# Patient Record
Sex: FEMALE | Race: WHITE | HISPANIC OR LATINO | Employment: OTHER | URBAN - METROPOLITAN AREA
[De-identification: names, ages, dates, MRNs, and addresses within clinical notes are randomized per-mention and may not be internally consistent; named-entity substitution may affect disease eponyms.]

---

## 2017-12-23 PROCEDURE — 3E0234Z INTRODUCTION OF SERUM, TOXOID AND VACCINE INTO MUSCLE, PERCUTANEOUS APPROACH: ICD-10-PCS | Performed by: HOSPITALIST

## 2017-12-24 ENCOUNTER — APPOINTMENT (OUTPATIENT)
Dept: RADIOLOGY | Facility: MEDICAL CENTER | Age: 67
DRG: 432 | End: 2017-12-24
Attending: HOSPITALIST
Payer: MEDICAID

## 2017-12-24 ENCOUNTER — HOSPITAL ENCOUNTER (INPATIENT)
Facility: MEDICAL CENTER | Age: 67
LOS: 4 days | DRG: 432 | End: 2017-12-28
Attending: EMERGENCY MEDICINE | Admitting: HOSPITALIST
Payer: MEDICAID

## 2017-12-24 ENCOUNTER — RESOLUTE PROFESSIONAL BILLING HOSPITAL PROF FEE (OUTPATIENT)
Dept: HOSPITALIST | Facility: MEDICAL CENTER | Age: 67
End: 2017-12-24
Payer: MEDICAID

## 2017-12-24 ENCOUNTER — APPOINTMENT (OUTPATIENT)
Dept: RADIOLOGY | Facility: MEDICAL CENTER | Age: 67
DRG: 432 | End: 2017-12-24
Attending: EMERGENCY MEDICINE
Payer: MEDICAID

## 2017-12-24 DIAGNOSIS — K92.2 UPPER GI BLEED: ICD-10-CM

## 2017-12-24 PROBLEM — K70.30 ALC (ALCOHOLIC LIVER CIRRHOSIS) (HCC): Status: ACTIVE | Noted: 2017-12-24

## 2017-12-24 PROBLEM — D64.9 ANEMIA: Status: ACTIVE | Noted: 2017-12-24

## 2017-12-24 PROBLEM — B18.2 CHRONIC HEPATITIS C WITH CIRRHOSIS (HCC): Status: ACTIVE | Noted: 2017-12-24

## 2017-12-24 PROBLEM — I95.9 HYPOTENSION: Status: ACTIVE | Noted: 2017-12-24

## 2017-12-24 PROBLEM — K74.60 CHRONIC HEPATITIS C WITH CIRRHOSIS (HCC): Status: ACTIVE | Noted: 2017-12-24

## 2017-12-24 PROBLEM — E46 PROTEIN MALNUTRITION (HCC): Status: ACTIVE | Noted: 2017-12-24

## 2017-12-24 PROBLEM — D61.818 PANCYTOPENIA (HCC): Status: ACTIVE | Noted: 2017-12-24

## 2017-12-24 LAB
ABO GROUP BLD: NORMAL
ABO GROUP BLD: NORMAL
ALBUMIN SERPL BCP-MCNC: 2.3 G/DL (ref 3.2–4.9)
ALBUMIN/GLOB SERPL: 0.6 G/DL
ALP SERPL-CCNC: 144 U/L (ref 30–99)
ALT SERPL-CCNC: 39 U/L (ref 2–50)
ANION GAP SERPL CALC-SCNC: 5 MMOL/L (ref 0–11.9)
APTT PPP: 40.1 SEC (ref 24.7–36)
AST SERPL-CCNC: 72 U/L (ref 12–45)
BARCODED ABORH UBTYP: 5100
BARCODED ABORH UBTYP: 5100
BARCODED ABORH UBTYP: 6200
BARCODED PRD CODE UBPRD: NORMAL
BARCODED UNIT NUM UBUNT: NORMAL
BASOPHILS # BLD AUTO: 0.8 % (ref 0–1.8)
BASOPHILS # BLD: 0.03 K/UL (ref 0–0.12)
BILIRUB SERPL-MCNC: 0.7 MG/DL (ref 0.1–1.5)
BLD GP AB SCN SERPL QL: NORMAL
BUN SERPL-MCNC: 17 MG/DL (ref 8–22)
CALCIUM SERPL-MCNC: 8.1 MG/DL (ref 8.5–10.5)
CFT BLD TEG: 4.7 MIN (ref 5–10)
CHLORIDE SERPL-SCNC: 113 MMOL/L (ref 96–112)
CLOT ANGLE BLD TEG: 59.6 DEGREES (ref 53–72)
CLOT LYSIS 30M P MA LENFR BLD TEG: 0 % (ref 0–8)
CO2 SERPL-SCNC: 21 MMOL/L (ref 20–33)
COMPONENT FT 8504FT: NORMAL
COMPONENT FT 8504FT: NORMAL
COMPONENT R 8504R: NORMAL
CREAT SERPL-MCNC: 0.45 MG/DL (ref 0.5–1.4)
CT.EXTRINSIC BLD ROTEM: 3 MIN (ref 1–3)
EOSINOPHIL # BLD AUTO: 0.12 K/UL (ref 0–0.51)
EOSINOPHIL NFR BLD: 3.3 % (ref 0–6.9)
ERYTHROCYTE [DISTWIDTH] IN BLOOD BY AUTOMATED COUNT: 53.1 FL (ref 35.9–50)
EST. AVERAGE GLUCOSE BLD GHB EST-MCNC: 97 MG/DL
FERRITIN SERPL-MCNC: 3.9 NG/ML (ref 10–291)
FOLATE SERPL-MCNC: 11.8 NG/ML
GFR SERPL CREATININE-BSD FRML MDRD: >60 ML/MIN/1.73 M 2
GLOBULIN SER CALC-MCNC: 4 G/DL (ref 1.9–3.5)
GLUCOSE SERPL-MCNC: 121 MG/DL (ref 65–99)
HBA1C MFR BLD: 5 % (ref 0–5.6)
HCT VFR BLD AUTO: 27.9 % (ref 37–47)
HCT VFR BLD AUTO: 28.3 % (ref 37–47)
HCV AB SER QL: REACTIVE
HGB BLD-MCNC: 8.6 G/DL (ref 12–16)
HGB BLD-MCNC: 8.7 G/DL (ref 12–16)
HGB RETIC QN AUTO: 24.8 PG/CELL (ref 29–35)
IMM GRANULOCYTES # BLD AUTO: 0.01 K/UL (ref 0–0.11)
IMM GRANULOCYTES NFR BLD AUTO: 0.3 % (ref 0–0.9)
IMM RETICS NFR: 18.2 % (ref 9.3–17.4)
INR PPP: 2.09 (ref 0.87–1.13)
IRON SATN MFR SERPL: 15 % (ref 15–55)
IRON SERPL-MCNC: 62 UG/DL (ref 40–170)
LIPASE SERPL-CCNC: 51 U/L (ref 11–82)
LYMPHOCYTES # BLD AUTO: 0.52 K/UL (ref 1–4.8)
LYMPHOCYTES NFR BLD: 14.2 % (ref 22–41)
MCF BLD TEG: 50 MM (ref 50–70)
MCH RBC QN AUTO: 24.4 PG (ref 27–33)
MCHC RBC AUTO-ENTMCNC: 30.7 G/DL (ref 33.6–35)
MCV RBC AUTO: 79.5 FL (ref 81.4–97.8)
MONOCYTES # BLD AUTO: 0.31 K/UL (ref 0–0.85)
MONOCYTES NFR BLD AUTO: 8.4 % (ref 0–13.4)
NEUTROPHILS # BLD AUTO: 2.68 K/UL (ref 2–7.15)
NEUTROPHILS NFR BLD: 73 % (ref 44–72)
NRBC # BLD AUTO: 0 K/UL
NRBC BLD-RTO: 0 /100 WBC
PLATELET # BLD AUTO: 81 K/UL (ref 164–446)
POTASSIUM SERPL-SCNC: 4.1 MMOL/L (ref 3.6–5.5)
PREALB SERPL-MCNC: 5 MG/DL (ref 18–38)
PRODUCT TYPE UPROD: NORMAL
PROT SERPL-MCNC: 6.3 G/DL (ref 6–8.2)
PROTHROMBIN TIME: 23.2 SEC (ref 12–14.6)
RBC # BLD AUTO: 3.56 M/UL (ref 4.2–5.4)
RETICS # AUTO: 0.04 M/UL (ref 0.04–0.06)
RETICS/RBC NFR: 1.1 % (ref 0.8–2.1)
RH BLD: NORMAL
SODIUM SERPL-SCNC: 139 MMOL/L (ref 135–145)
TEG ALGORITHM TGALG: ABNORMAL
TIBC SERPL-MCNC: 409 UG/DL (ref 250–450)
TSH SERPL DL<=0.005 MIU/L-ACNC: 1.7 UIU/ML (ref 0.38–5.33)
UNIT STATUS USTAT: NORMAL
VIT B12 SERPL-MCNC: 540 PG/ML (ref 211–911)
WBC # BLD AUTO: 3.7 K/UL (ref 4.8–10.8)

## 2017-12-24 PROCEDURE — 85576 BLOOD PLATELET AGGREGATION: CPT

## 2017-12-24 PROCEDURE — C9113 INJ PANTOPRAZOLE SODIUM, VIA: HCPCS | Performed by: INTERNAL MEDICINE

## 2017-12-24 PROCEDURE — 76705 ECHO EXAM OF ABDOMEN: CPT

## 2017-12-24 PROCEDURE — 71020 DX-CHEST-2 VIEWS: CPT

## 2017-12-24 PROCEDURE — 160048 HCHG OR STATISTICAL LEVEL 1-5: Performed by: INTERNAL MEDICINE

## 2017-12-24 PROCEDURE — 500066 HCHG BITE BLOCK, ECT: Performed by: INTERNAL MEDICINE

## 2017-12-24 PROCEDURE — C9113 INJ PANTOPRAZOLE SODIUM, VIA: HCPCS | Performed by: HOSPITALIST

## 2017-12-24 PROCEDURE — 85046 RETICYTE/HGB CONCENTRATE: CPT

## 2017-12-24 PROCEDURE — 85025 COMPLETE CBC W/AUTO DIFF WBC: CPT

## 2017-12-24 PROCEDURE — 700111 HCHG RX REV CODE 636 W/ 250 OVERRIDE (IP): Performed by: HOSPITALIST

## 2017-12-24 PROCEDURE — 96368 THER/DIAG CONCURRENT INF: CPT

## 2017-12-24 PROCEDURE — 700105 HCHG RX REV CODE 258: Performed by: HOSPITALIST

## 2017-12-24 PROCEDURE — 86901 BLOOD TYPING SEROLOGIC RH(D): CPT

## 2017-12-24 PROCEDURE — 304561 HCHG STAT O2

## 2017-12-24 PROCEDURE — 83690 ASSAY OF LIPASE: CPT

## 2017-12-24 PROCEDURE — 83540 ASSAY OF IRON: CPT

## 2017-12-24 PROCEDURE — P9016 RBC LEUKOCYTES REDUCED: HCPCS

## 2017-12-24 PROCEDURE — 36430 TRANSFUSION BLD/BLD COMPNT: CPT

## 2017-12-24 PROCEDURE — 86923 COMPATIBILITY TEST ELECTRIC: CPT

## 2017-12-24 PROCEDURE — 82746 ASSAY OF FOLIC ACID SERUM: CPT

## 2017-12-24 PROCEDURE — 94760 N-INVAS EAR/PLS OXIMETRY 1: CPT

## 2017-12-24 PROCEDURE — A9270 NON-COVERED ITEM OR SERVICE: HCPCS | Performed by: HOSPITALIST

## 2017-12-24 PROCEDURE — 700102 HCHG RX REV CODE 250 W/ 637 OVERRIDE(OP): Performed by: HOSPITALIST

## 2017-12-24 PROCEDURE — 96365 THER/PROPH/DIAG IV INF INIT: CPT

## 2017-12-24 PROCEDURE — 85384 FIBRINOGEN ACTIVITY: CPT

## 2017-12-24 PROCEDURE — 85730 THROMBOPLASTIN TIME PARTIAL: CPT

## 2017-12-24 PROCEDURE — 82607 VITAMIN B-12: CPT

## 2017-12-24 PROCEDURE — 99152 MOD SED SAME PHYS/QHP 5/>YRS: CPT | Performed by: INTERNAL MEDICINE

## 2017-12-24 PROCEDURE — 160203 HCHG ENDO MINUTES - 1ST 30 MINS LEVEL 4: Performed by: INTERNAL MEDICINE

## 2017-12-24 PROCEDURE — 96375 TX/PRO/DX INJ NEW DRUG ADDON: CPT

## 2017-12-24 PROCEDURE — 84443 ASSAY THYROID STIM HORMONE: CPT

## 2017-12-24 PROCEDURE — 86850 RBC ANTIBODY SCREEN: CPT

## 2017-12-24 PROCEDURE — 83550 IRON BINDING TEST: CPT

## 2017-12-24 PROCEDURE — 85347 COAGULATION TIME ACTIVATED: CPT

## 2017-12-24 PROCEDURE — P9017 PLASMA 1 DONOR FRZ W/IN 8 HR: HCPCS

## 2017-12-24 PROCEDURE — 80053 COMPREHEN METABOLIC PANEL: CPT

## 2017-12-24 PROCEDURE — 30233K1 TRANSFUSION OF NONAUTOLOGOUS FROZEN PLASMA INTO PERIPHERAL VEIN, PERCUTANEOUS APPROACH: ICD-10-PCS | Performed by: INTERNAL MEDICINE

## 2017-12-24 PROCEDURE — 700105 HCHG RX REV CODE 258: Performed by: INTERNAL MEDICINE

## 2017-12-24 PROCEDURE — 99233 SBSQ HOSP IP/OBS HIGH 50: CPT | Performed by: HOSPITALIST

## 2017-12-24 PROCEDURE — 85018 HEMOGLOBIN: CPT

## 2017-12-24 PROCEDURE — 82728 ASSAY OF FERRITIN: CPT

## 2017-12-24 PROCEDURE — 502240 HCHG MISC OR SUPPLY RC 0272: Performed by: INTERNAL MEDICINE

## 2017-12-24 PROCEDURE — 06L38CZ OCCLUSION OF ESOPHAGEAL VEIN WITH EXTRALUMINAL DEVICE, VIA NATURAL OR ARTIFICIAL OPENING ENDOSCOPIC: ICD-10-PCS | Performed by: INTERNAL MEDICINE

## 2017-12-24 PROCEDURE — 700111 HCHG RX REV CODE 636 W/ 250 OVERRIDE (IP): Performed by: INTERNAL MEDICINE

## 2017-12-24 PROCEDURE — 84134 ASSAY OF PREALBUMIN: CPT

## 2017-12-24 PROCEDURE — 770022 HCHG ROOM/CARE - ICU (200)

## 2017-12-24 PROCEDURE — 83036 HEMOGLOBIN GLYCOSYLATED A1C: CPT

## 2017-12-24 PROCEDURE — 86900 BLOOD TYPING SEROLOGIC ABO: CPT

## 2017-12-24 PROCEDURE — 87522 HEPATITIS C REVRS TRNSCRPJ: CPT

## 2017-12-24 PROCEDURE — 700105 HCHG RX REV CODE 258: Performed by: EMERGENCY MEDICINE

## 2017-12-24 PROCEDURE — 85610 PROTHROMBIN TIME: CPT

## 2017-12-24 PROCEDURE — 99291 CRITICAL CARE FIRST HOUR: CPT

## 2017-12-24 PROCEDURE — 86803 HEPATITIS C AB TEST: CPT

## 2017-12-24 PROCEDURE — 85014 HEMATOCRIT: CPT

## 2017-12-24 RX ORDER — MIDAZOLAM HYDROCHLORIDE 1 MG/ML
1-5 INJECTION INTRAMUSCULAR; INTRAVENOUS ONCE
Status: COMPLETED | OUTPATIENT
Start: 2017-12-24 | End: 2017-12-24

## 2017-12-24 RX ORDER — SODIUM CHLORIDE 9 MG/ML
INJECTION, SOLUTION INTRAVENOUS ONCE
Status: COMPLETED | OUTPATIENT
Start: 2017-12-24 | End: 2017-12-24

## 2017-12-24 RX ORDER — SODIUM CHLORIDE 9 MG/ML
INJECTION, SOLUTION INTRAVENOUS CONTINUOUS
Status: DISCONTINUED | OUTPATIENT
Start: 2017-12-24 | End: 2017-12-25

## 2017-12-24 RX ORDER — BISACODYL 10 MG
10 SUPPOSITORY, RECTAL RECTAL
Status: DISCONTINUED | OUTPATIENT
Start: 2017-12-24 | End: 2017-12-28 | Stop reason: HOSPADM

## 2017-12-24 RX ORDER — PANTOPRAZOLE SODIUM 40 MG/10ML
40 INJECTION, POWDER, LYOPHILIZED, FOR SOLUTION INTRAVENOUS 2 TIMES DAILY
Status: DISCONTINUED | OUTPATIENT
Start: 2017-12-24 | End: 2017-12-26

## 2017-12-24 RX ORDER — AMOXICILLIN 250 MG
2 CAPSULE ORAL 2 TIMES DAILY
Status: DISCONTINUED | OUTPATIENT
Start: 2017-12-24 | End: 2017-12-28 | Stop reason: HOSPADM

## 2017-12-24 RX ORDER — MIDAZOLAM HYDROCHLORIDE 1 MG/ML
INJECTION INTRAMUSCULAR; INTRAVENOUS
Status: COMPLETED
Start: 2017-12-24 | End: 2017-12-24

## 2017-12-24 RX ORDER — DIPHENHYDRAMINE HYDROCHLORIDE 50 MG/ML
12.5 INJECTION INTRAMUSCULAR; INTRAVENOUS EVERY 6 HOURS PRN
Status: DISCONTINUED | OUTPATIENT
Start: 2017-12-24 | End: 2017-12-28 | Stop reason: HOSPADM

## 2017-12-24 RX ORDER — SODIUM CHLORIDE 9 MG/ML
500 INJECTION, SOLUTION INTRAVENOUS
Status: ACTIVE | OUTPATIENT
Start: 2017-12-24 | End: 2017-12-24

## 2017-12-24 RX ORDER — MIDAZOLAM HYDROCHLORIDE 1 MG/ML
.5-2 INJECTION INTRAMUSCULAR; INTRAVENOUS PRN
Status: ACTIVE | OUTPATIENT
Start: 2017-12-24 | End: 2017-12-24

## 2017-12-24 RX ORDER — POLYETHYLENE GLYCOL 3350 17 G/17G
1 POWDER, FOR SOLUTION ORAL
Status: DISCONTINUED | OUTPATIENT
Start: 2017-12-24 | End: 2017-12-28 | Stop reason: HOSPADM

## 2017-12-24 RX ADMIN — STANDARDIZED SENNA CONCENTRATE AND DOCUSATE SODIUM 2 TABLET: 8.6; 5 TABLET, FILM COATED ORAL at 20:25

## 2017-12-24 RX ADMIN — FENTANYL CITRATE 50 MCG: 50 INJECTION INTRAMUSCULAR; INTRAVENOUS at 13:04

## 2017-12-24 RX ADMIN — SODIUM CHLORIDE 80 MG: 9 INJECTION, SOLUTION INTRAVENOUS at 09:08

## 2017-12-24 RX ADMIN — MIDAZOLAM HYDROCHLORIDE 2 MG: 1 INJECTION, SOLUTION INTRAMUSCULAR; INTRAVENOUS at 13:06

## 2017-12-24 RX ADMIN — OCTREOTIDE ACETATE 50 MCG/HR: 200 INJECTION, SOLUTION INTRAVENOUS; SUBCUTANEOUS at 14:02

## 2017-12-24 RX ADMIN — PANTOPRAZOLE SODIUM 40 MG: 40 INJECTION, POWDER, FOR SOLUTION INTRAVENOUS at 20:24

## 2017-12-24 RX ADMIN — SODIUM CHLORIDE: 9 INJECTION, SOLUTION INTRAVENOUS at 08:00

## 2017-12-24 RX ADMIN — SODIUM CHLORIDE: 9 INJECTION, SOLUTION INTRAVENOUS at 08:15

## 2017-12-24 RX ADMIN — SODIUM CHLORIDE 8 MG/HR: 9 INJECTION, SOLUTION INTRAVENOUS at 09:24

## 2017-12-24 RX ADMIN — DIPHENHYDRAMINE HYDROCHLORIDE 12.5 MG: 50 INJECTION, SOLUTION INTRAMUSCULAR; INTRAVENOUS at 13:56

## 2017-12-24 RX ADMIN — THIAMINE HYDROCHLORIDE 100 MG: 100 INJECTION, SOLUTION INTRAMUSCULAR; INTRAVENOUS at 09:29

## 2017-12-24 RX ADMIN — CEFTRIAXONE 2 G: 2 INJECTION, POWDER, FOR SOLUTION INTRAMUSCULAR; INTRAVENOUS at 13:09

## 2017-12-24 ASSESSMENT — ENCOUNTER SYMPTOMS
SPEECH CHANGE: 0
ABDOMINAL PAIN: 0
DIZZINESS: 0
BACK PAIN: 0
CHILLS: 0
COUGH: 0
MYALGIAS: 0
EYE DISCHARGE: 0
BLURRED VISION: 0
DEPRESSION: 0
NAUSEA: 0
VOMITING: 0
NERVOUS/ANXIOUS: 0
FEVER: 0
WEAKNESS: 1
SHORTNESS OF BREATH: 0
HEARTBURN: 1
BRUISES/BLEEDS EASILY: 0
BLOOD IN STOOL: 0
DOUBLE VISION: 0

## 2017-12-24 ASSESSMENT — PAIN SCALES - GENERAL
PAINLEVEL_OUTOF10: 0
PAINLEVEL_OUTOF10: 3
PAINLEVEL_OUTOF10: 3
PAINLEVEL_OUTOF10: 0

## 2017-12-24 NOTE — CONSULTS
Pulmonary & Critical Care Consult Note    DATE OF CONSULTATION:  12/24/2017     REFERRING PHYSICIAN:  Dr. Servin     CONSULTANT:  Dre Davis III, MD     REASON FOR CONSULTATION: GI bleed, hypotension     HISTORY OF PRESENT ILLNESS:  This patient is a pleasant 67-year-old woman who speaks very little English. She was transferred in from [place] for a GI bleed. She presented with about 2 AM with hematemesis. She had a GI bleed about a year ago and again about 3 months ago we don't have any records as of yet. She cannot recall the specific etiology but was told she had hepatitis C. He denies much alcohol took Advil couple times a few days ago and doesn't take it regularly and smokes just a few cigarettes a day. She has no abdominal pain now. She's never told than ulcer or varices. Bleeding history in the family. He is seen in the ICU on arrival and has a blood pressure in the 80s but her heart rates in the 70s and she states her blood pressure does usually run low. Entire history was through family that spoke English. IV fluid resuscitation ongoing. I consultation followed my consultation and she is going for EGD this afternoon and I am told.     PAST MEDICAL HISTORY:    Upper GI bleed one year ago associated with hematemesis  Upper GI bleed 3 months ago associated with melena and hematemesis, she had an EGD in [place]  Vague history of hepatitis C, looking for documentation  Old records requested    Past Medical History:   Diagnosis Date   • Anemia    • Cirrhosis with alcoholism (CMS-HCC)    • Coagulopathy (CMS-HCC)    • Liver disease         PAST SURGICAL HISTORY:    History reviewed. No pertinent surgical history.     ALLERGIES:    Patient has no known allergies.     MEDICATIONS PRIOR TO ADMISSION:   No current facility-administered medications on file prior to encounter.      No current outpatient prescriptions on file prior to encounter.       SOCIAL HISTORY:    She smokes a couple cigarettes a day at most in  "the past but has quit  Alcohol consumption is none now but she used to drink may be a drink a day never heavy    Social History     Social History   • Marital status: Single     Spouse name: N/A   • Number of children: N/A   • Years of education: N/A     Occupational History   • Not on file.     Social History Main Topics   • Smoking status: Never Smoker   • Smokeless tobacco: Never Used   • Alcohol use Not on file   • Drug use: Unknown   • Sexual activity: Not on file     Other Topics Concern   • Not on file     Social History Narrative   • No narrative on file       FAMILY HISTORY:   History reviewed. No pertinent family history.     REVIEW OF SYSTEMS:    Constitutional: No fever, no chills, No sweats  Respiratory: No cough, no hemoptysis, no dyspnea  Cardiovascular: No chest pain, no palpitations, no orthopnea, no PND, no lower extremity swelling  GI: Nausea has improved, no vomiting since 2 AM, minimal abdominal pain, no diarrhea, no constipation, no hematochezia, no melena  : no dysuria, no frequency, no urgency, no flank pain  Endocrine: No polyuria, no polydipsia, no hair loss  Hematology: No easy bruising, no bleeding  Musculoskeletal: No joint swelling, no joint erythema, no arthralgia, no myalgias  Neuro: No seizures, no numbness, no tingling sensation, no extremity weakness, no change in vision.  Psychiatry: no depression, no suicidal ideation     PHYSICAL EXAMINATION:  /56   Pulse 79   Temp 37.2 °C (98.9 °F)   Resp 16   Ht 1.499 m (4' 11\")   Wt 53.5 kg (117 lb 15.1 oz)   SpO2 99%   BMI 23.82 kg/m²   GENERAL: Appears acutely and chronically ill, he appears her stated age, [place] speaking only  HEENT: PERRLA, nontender sinuses, no blood in the nares or oropharynx, M&P I airway  NECK: Supple without meningeal signs, trach midline without adenopathy, no thyromegaly, neck veins not distended  PULM: Clear to auscultation and percussion, no wheezes, rales or rhonchi, speaking in full " sentences  CVS: Regular rate and rhythm, no murmurs, rubs or gallops, okay pulses, normal capillary refill  ABDOMEN: Soft, NT/ND, no CVAT, no Xiang or Shook Laguerre sign, normal bowel sounds  EXTREMITIES:  No cyanosis, clubbing or edema  SKIN: Warm and dry without rash or lesion, no stigmata of cirrhosis  NEURO: Through  the patient is alert and oriented ×4, motor and sensory exams are nonfocal, not getting out of bed to examine her    LABORATORY DATA:    Lab Results   Component Value Date/Time    WBC 3.7 (L) 12/24/2017 05:10 AM    RBC 3.56 (L) 12/24/2017 05:10 AM    HEMOGLOBIN 8.7 (L) 12/24/2017 05:10 AM    HEMATOCRIT 28.3 (L) 12/24/2017 05:10 AM    MCV 79.5 (L) 12/24/2017 05:10 AM    MCH 24.4 (L) 12/24/2017 05:10 AM    MCHC 30.7 (L) 12/24/2017 05:10 AM    NEUTSPOLYS 73.00 (H) 12/24/2017 05:10 AM    LYMPHOCYTES 14.20 (L) 12/24/2017 05:10 AM    MONOCYTES 8.40 12/24/2017 05:10 AM    EOSINOPHILS 3.30 12/24/2017 05:10 AM    BASOPHILS 0.80 12/24/2017 05:10 AM      Lab Results   Component Value Date/Time    SODIUM 139 12/24/2017 05:10 AM    POTASSIUM 4.1 12/24/2017 05:10 AM    CHLORIDE 113 (H) 12/24/2017 05:10 AM    CO2 21 12/24/2017 05:10 AM    GLUCOSE 121 (H) 12/24/2017 05:10 AM    BUN 17 12/24/2017 05:10 AM    CREATININE 0.45 (L) 12/24/2017 05:10 AM      Lab Results   Component Value Date/Time    PROTHROMBTM 23.2 (H) 12/24/2017 08:30 AM    INR 2.09 (H) 12/24/2017 08:30 AM         IMAGING:   CXR (personally reviewed) - Lungs are clear, cardiac silhouettes normal, no pneumothorax, mass or effusion about grossly normal chest x-ray with no active cardiopulmonary disease noted    US-LIVER AND BILIARY TREE   Final Result      1.  Significant gallbladder wall thickening is nonspecific and may be related to chronic liver disease. Acalculous cholecystitis could have this appearance in the appropriate clinical setting.      2.  Coarsened hepatic echotexture suggests chronic liver disease.      DX-CHEST-2 VIEWS   Final  Result      No acute cardiopulmonary disease.             ASSESSMENT/PLAN:  Recurrent upper gastrointestinal hemorrhage, suspect variceal bleeding   Admit to ICU   Place 2 large bore IVs   Fluid resuscitation ongoing   Serial H/H   COD sent in case she needs blood products   PPI bolus and infusion and escalate to bolus twice a day when clinically appropriate   Octreotide infusion   Nothing by mouth, EGD later today I'm told   GI consult pending   Pain history from transferring hospital in regards to from 3 months ago and one year ago  Hypotension   Family and patient stated blood pressure normally runs low in the 90s   Urinary output, good mentation and NSR the 70s without orthostatic symptoms suggest this is true   Continue current fluid resuscitation   Monitor for the need to place central line and initiate pressors   Ideally resuscitate hemorrhagic shock with fluids and blood products first   She may benefit from albumin as well  History of cirrhosis with hepatitis C versus alcohol, history incomplete   Alcoholism may be an etiology for cirrhosis as well   She has associated thrombocytopenia, coagulopathy, hypoalbuminemia   Obtain old records from [place]   GI consult pending, it is their role for Harvoni treatment in this patient?  Coagulopathy   Coags elevated, TEG study suggests he is not hypocoagulable   FFP already ordered   Follow laboratory studies daily  Pancytopenia   Etiology I suspect is cirrhosis, further workup clinically after acute bleeding process resolved  Hypoalbuminemia   We'll give salt poor albumin infusions for hypotension if necessary   Etiology of this process I believe his cirrhosis  Prophylaxis    Prognosis guarded.  Critically ill with unstable GI bleed with negative acute anemia associated with trauma cytopenia, coagulopathy and hypoalbuminemia.  Her blood pressure is running low at this time but she is tolerating it so far and fluid resuscitation ongoing.  She will be monitored closely  with serial H&H and vital sign assessment and monitor her or the need for blood product infusion and/or central line placement for better axis. High risk of deterioration and worsening vital organ dysfunction and death without the above critical care interventions.    Addendum:  She is just back from EGD, significant esophageal varices identified and they were banded, I spoke with GI at great length, continue octreotide but brought back to bolus PPI IV.    Patient is critically ill at this time.    I have discussed the case with the patient, for family members including 2 who speak English very well, mentioned physician, GI consultant, nursing and charge nurse.  Critical care time: 55 min.   No time overlap. Procedures not included in time.     Thank you for asking me to consult on the patient.  I appreciate the opportunity to assist in their care and will follow along closely with you.          Dre Davis III, MD  Critical Care Medicine

## 2017-12-24 NOTE — CONSULTS
Date of Service:12/24/17    Consult Requested By: No att. providers found    Reason for Consultation: hematemesis    History of Present Illness: 67 y.o. female who presented 12/24/2017 with Acute GI bleed . She's had hematemesis bright red blood acute onset yesterday evening . Transferred to [facility] also had another episode of hematemesis noted by the paramedics, they do note there was a large amount of hematemesis at the scene. She was hypotensive upon arrival which improved with fluids . She's had no bleeding en route to our emergency Department [facility] emergency room. She denies any alcohol use no nausea no abdominal pain no diarrhea no constipation. She has had NSAID use with frequent headaches she takes Advil a few pills at a time . No Advil use currently . She states she's quit alcohol several weeks ago . She's also had a diagnosis of hepatitis C she is unsure if she has started treatment or has seen a hepatologist or GI doctor for this . She's also been complaining of heartburn + severe for the past several days. She's also been complaining of feeling weak, but presently denies any light headedness or dizziness, fever chills, SOB , CP dysphagia, odynophagia,reflux, +GERD, no abdominal complains,  abnormalities weakness numbness     Review Of Systems:  See above    PMH:   Past Medical History:   Diagnosis Date   • Anemia    • Cirrhosis with alcoholism (CMS-HCC)    • Coagulopathy (CMS-HCC)    • Liver disease          PSH:  History reviewed. No pertinent surgical history.    FAMILY HX:  History reviewed. No pertinent family history.    SOCIAL HX:  Social History     Social History   • Marital status: Single     Spouse name: N/A   • Number of children: N/A   • Years of education: N/A     Occupational History   • Not on file.     Social History Main Topics   • Smoking status: Never Smoker   • Smokeless tobacco: Never Used   • Alcohol use Not on file   • Drug use: Unknown   • Sexual activity: Not on file      Other Topics Concern   • Not on file     Social History Narrative   • No narrative on file     History   Smoking Status   • Never Smoker   Smokeless Tobacco   • Never Used     History   Alcohol use Not on file       Allergies/Intolerances:  No Known Allergies    History reviewed with the patient    Other Current Medications:    Current Facility-Administered Medications:   •  pantoprazole (PROTONIX) 80 mg in  mL Infusion, 8 mg/hr, Intravenous, Continuous, Umesh Servin M.D., Last Rate: 25 mL/hr at 17 0924, 8 mg/hr at 17 0924  •  thiamine (B-1) 100 mg in D5W 100 mL IVPB, 100 mg, Intravenous, DAILY, Umesh Servin M.D., Stopped at 17 1004  •  folic acid 1 mg in NS 50 mL IVPB, 1 mg, Intravenous, Q24HRS, Umesh Servin M.D.  •  NS infusion, , Intravenous, Continuous, Vanna Doan M.D.  •  senna-docusate (PERICOLACE or SENOKOT S) 8.6-50 MG per tablet 2 Tab, 2 Tab, Oral, BID, Stopped at 17 0900 **AND** polyethylene glycol/lytes (MIRALAX) PACKET 1 Packet, 1 Packet, Oral, QDAY PRN **AND** magnesium hydroxide (MILK OF MAGNESIA) suspension 30 mL, 30 mL, Oral, QDAY PRN **AND** bisacodyl (DULCOLAX) suppository 10 mg, 10 mg, Rectal, QDAY PRN, Umesh Servin M.D.  [unfilled]    Most Recent Vital Signs:  BP (!) 80/47   Pulse 78   Temp 36.2 °C (97.2 °F)   Resp 13   Ht 1.524 m (5')   Wt 50.8 kg (112 lb)   SpO2 100%   BMI 21.87 kg/m²   Temp  Av.5 °C (97.7 °F)  Min: 36.2 °C (97.2 °F)  Max: 36.8 °C (98.2 °F)    Physical Exam:  General: Nontoxic, no acute distress  HEENT: sclera anicteric, PERRL, EOMI, MMM, no oral lesions  Neck: supple, no lymphadenopathy  Chest: CTAB, no r/r/w, normal work of breathing.  Cardiac: Regular, rate ,  SHAYLA 3/6 no gallops heard  Abdomen: + bowel sounds, soft, non-tender, non-distended, no HSM  Extremities: No edema. No joint swelling.  Skin: no rashes or erythema  Neuro: Alert and oriented times 3, non-focal exam    Pertinent Lab  Results:  Recent Labs      12/24/17   0510   WBC  3.7*      Recent Labs      12/24/17   0510   HEMOGLOBIN  8.7*   HEMATOCRIT  28.3*   MCV  79.5*   MCH  24.4*   PLATELETCT  81*         Recent Labs      12/24/17   0510   SODIUM  139   POTASSIUM  4.1   CHLORIDE  113*   CO2  21   CREATININE  0.45*        Recent Labs      12/24/17   0510   ALBUMIN  2.3*      Recent Labs      12/24/17   0510  12/24/17   0830   ASTSGOT  72*   --    ALTSGPT  39   --    TBILIRUBIN  0.7   --    ALKPHOSPHAT  144*   --    GLOBULIN  4.0*   --    INR   --   2.09*       [unfilled]      Pertinent Micro:  Results     Procedure Component Value Units Date/Time    URINALYSIS CULTURE, IF INDICATED [[id number]]     Order Status:  Sent Specimen:  Urine         No results found for: BLOODCULTU, BLDCULT, BCHOLD     Studies:              Results for orders placed during the hospital encounter of 12/24/17   DX-CHEST-2 VIEWS    Impression No acute cardiopulmonary disease.                                                                                  IMPRESSION:   Hematemesis  Melena  HCV +, though admits to no risk factors  Cirrhosis        PLAN:     WE would recommend octreotide IV,IV Protonix 40 mg BID npo, two large IV bore needles, keep H/H >7/21 with blood transfusion and correct underlying coagulopathy with FFP and VItK, check an U/S of Abd and AFP level   We would like to perform and EGD once she has received her FFP.  Continue supportive care with IV hydration and ICU supportive care.  Possible variceal bleed vs GV vs ulcer formation given history of NSAIDS.    Discussed with IM. Will continue to follow    Gen Currie M.D.

## 2017-12-24 NOTE — ASSESSMENT & PLAN NOTE
Due to acute blood loss from upper GI bleed.  egd with esophageal varices s/p banding   CBC in a.m.   on cld  - ppi bid  Off octreotide gtt  B12, folate wnl

## 2017-12-24 NOTE — ASSESSMENT & PLAN NOTE
On lactulose  GI   Hx of alcohol abuse states she has not drank in a while; would monitor   U/s with chronic liver dz  - advised etoh cessation

## 2017-12-24 NOTE — ASSESSMENT & PLAN NOTE
Hematemesis prior to admission with anemia and hypotension requiring ICU admission.  Serial Hb with transfusion for Hb <7 or symptomatic  GI , Dr. Landry  EGD revealed esophageal varices which were banded..  Cont Rocephin for 5 day course

## 2017-12-24 NOTE — ASSESSMENT & PLAN NOTE
Unsure if patient has completed treatment; she does not know and no reccords found  Outpatient follow up is appropriate.  - f/u hep C viral load

## 2017-12-24 NOTE — OR SURGEON
Immediate Post OP Note    PreOp Diagnosis: melena/hematemesis    PostOp Diagnosis:    1/ grade three esophageal varices three columns 34-38 cm    2/ old residual blood in stomach    3/ otherwise normal EGD to third portion of the duodenum    Procedure(s):  GASTROSCOPY-ENDO    Surgeon(s):  Gen Currie M.D.    Anesthesiologist/Type of Anesthesia:  No anesthesia staff entered./* No anesthesia type entered *  Fentanyl 50 mcg IV and 2 mg Versed IV  Surgical Staff:  Endoscopy Technician: (Unknown)  Sedation/Monitoring Nurse: (Unknown)  ICU nursing   Chase Sullivan  Specimens:  * No specimens in log *    Findings    Narrative:   Prior to the procedure the patient was informed of the risk and benefits of the procedure all question were answered.  The patient was sedated and once adequate sedation was achieved the bite block was placed. They were placed in the left lateral decubitus positron.   Using the standard Olympus gastroscope it was introduced through the oral pharynx and passed into the esophagus.  Intubation of the esophagus was achieved easily and grade 3 esophageal varices 3 columns extending from 34-38 cm. The GEJ 38 cm.   Intubation of the gastric cavity was performed and insufflation was achieved allowing for a panoramic view of the entire gastric mucosa. There is old residual blood appreciated within the gastric cavity no active bleeding was appreciated.  Advancement into the antrum allowed for visualization of the antrum and pylorus both of these structures appeared normal.  Intubation of the pylorus allowed for visualization of the duodenal bulb along with the second and third portion, all these structures appeared normal.  No mucosal abnormalities were appreciated.  Extubation of the pylorus allowed for retroflexion to be performed in the antrum to visualize the cardia, fundus and body, which also appeared normal no signs of gastric varices were appreciated no active bleeding was noted.  The gastroscope  was straightened and gradually withdrawn a banding device was then subsequently deployed under the scope and reintroduced. 5 bands were deployed on the 3 columns of the esophageal varices with good obliteration of the varices. No bleeding was appreciated during or after banding procedure.  The gastroscope was straightened excess air was removed and patient tolerated the procedure well.    Complications:none    Recommendations      Continue current therapy IV protonix and octreotide Iv for 72 hrs continue antibiotics   Start clear liquid diet in am if stable   Continue H/H monitoring keep > 7/21 with blood transfusion    If there are any further concerns or issues please feel free to give us call at [phone] we will still follow along.    12/24/2017 1:22 PM Gen Currie

## 2017-12-24 NOTE — ED NOTES
Med rec complete per Pt at bedside  Pt denies any medication   Allergies reviewed  No ABX in last 30 days

## 2017-12-24 NOTE — ED PROVIDER NOTES
ED Provider Note    Chief Complaint:   Upper GI bleed    HPI:  Brigitte Gonzales is a 67 y.o. female who presents as transfer from [facility] with concern for upper GI bleed. She has a history of hepatitis C, history of alcoholism but denies recent alcohol use. Earlier this evening she had an episode of hematemesis and activated paramedics. She was taken to [facility] emergency department. Since she arrived at that emergency department she had no further episodes of GI bleed, however paramedics report that there was a large amount of hematemesis at the scene. It is reported that she had hypotension prior to arrival which improved with fluids. She has not had any subsequent episodes of bleeding since arriving to that emergency department, no bleeding en route to our emergency department.    I discussed the case with the referring physician who states that he treated her with octreotide and Protonix prior to transfer. He reports that she had endoscopy performed in October 2016 as well as October 2017 after similar episodes of upper GI bleed, however the source of the bleed was not able to be found. These were done by the general surgical service. After this episode of upper GI bleeding, general surgery recommends transfer to our facility with GI availability, there is no gastroenterology availability at referring facility.    Review of Systems:  See HPI for pertinent positives and negatives. All other systems negative.    Past Medical History:   has a past medical history of Anemia; Cirrhosis with alcoholism (CMS-HCC); Coagulopathy (CMS-HCC); and Liver disease.    Social History:  Social History     Social History Main Topics   • Smoking status: Never Smoker   • Smokeless tobacco: Never Used   • Alcohol use Not on file   • Drug use: Unknown   • Sexual activity: Not on file       Surgical History:  patient denies any surgical history    Current Medications:  Home Medications    **Home medications have not yet  been reviewed for this encounter**         Allergies:  Not on File    Physical Exam:  Vital Signs: BP (!) 94/50   Pulse 85   Temp 36.8 °C (98.2 °F)   Resp (!) 0   Ht 1.524 m (5')   Wt 50.8 kg (112 lb)   SpO2 100%   BMI 21.87 kg/m²   Constitutional: Alert, no acute distress  HENT: Normocephalic, atraumatic, moist mucus membranes, no blood in oropharynx  Eyes: Pupils equal and reactive, normal conjunctiva, non-icteric  Neck: Supple, normal range of motion, no stridor  Cardiovascular: Normal peripheral perfusion, II/IV systolic ejection murmur, no cyanosis  Pulmonary: No respiratory distress, normal work of breathing, no accessory muscule usage, breath sounds clear and equal bilaterally  Abdomen: Bowel sounds present, non-tender, non-distended with no peritoneal signs, no palpable masses  Skin: Warm, dry, no rashes or lesions  Back: No pain with active range of motion  Musculoskeletal: Normal range of motion in all extremities, no swelling or deformity noted  Neurologic: Alert, oriented, normal motor function, no speech deficits  Psychiatric: Normal and appropriate mood and affect    Labs:  Labs Reviewed   CBC WITH DIFFERENTIAL - Abnormal; Notable for the following:        Result Value    WBC 3.7 (*)     RBC 3.56 (*)     Hemoglobin 8.7 (*)     Hematocrit 28.3 (*)     MCV 79.5 (*)     MCH 24.4 (*)     MCHC 30.7 (*)     RDW 53.1 (*)     Platelet Count 81 (*)     Neutrophils-Polys 73.00 (*)     Lymphocytes 14.20 (*)     Lymphs (Absolute) 0.52 (*)     All other components within normal limits   COMP METABOLIC PANEL - Abnormal; Notable for the following:     Chloride 113 (*)     Glucose 121 (*)     Creatinine 0.45 (*)     Calcium 8.1 (*)     AST(SGOT) 72 (*)     Alkaline Phosphatase 144 (*)     Albumin 2.3 (*)     Globulin 4.0 (*)     All other components within normal limits   LIPASE   ESTIMATED GFR   COD (ADULT)   BASIC TEG    Narrative:     Do you want to extend TEG graph to LY30? (If no, graph will  terminate at  MA)->Yes  Is the patient on heparin (including low molecular weight  heparin, subcutaneous heparin, or lovenox)?->No   ABO CONFIRMATION   URINALYSIS,CULTURE IF INDICATED       Radiology:  DX-CHEST-2 VIEWS   Final Result      No acute cardiopulmonary disease.           Medical records reviewed for continuity of care. Records reviewed from referring facility, [facility]. White blood count 3.3, hemoglobin 9.4, 1% bands. Potassium 3.5, troponin undetectable. Patient treated with Zofran, protonix, octreotide and fluid bolus prior to transfer. Presented to that facility after 2 episodes of hemoptysis. She has had 2 endoscopies performed at that facility by general surgery, surgeon recommended transfer to facility that has gastroenterology availability. History of prior regular alcohol use, denies history of alcoholism, varices not seen on prior endoscopies. Noted is a recent diagnosis of hepatitis C. Patient is not known to be established with a gastroenterologist. Also describes a history of liver cirrhosis. She does have a history of anti-inflammatory use prior to a previous GI bleed, was instructed to stop all NSAID use after that event.    Differential diagnosis:  Upper GI bleed, variceal bleed, peptic ulcer, anemia, electrolyte abnormality    MDM:  Patient presents as transfer from [facility] for upper GI bleed. Hemoglobin prior to transfer is 9.4. Hemoglobin is 8.7 on arrival to our emergency department. Platelet count is 81. She does have a documented history of thrombocytopenia on prior admissions as noted on medical record review. AST and alkaline phosphatase are mildly elevated, creatinine is within normal limits. Chest x-ray is negative for acute process. She did receive octreotide and Protonix prior to transfer.    She does not appear to have ongoing severe hemorrhage at this time. She has remained hemodynamically stable in the emergency department. I did place a call to gastroenterology, discussed the  case with Dr. Lopez, on call for digestive [facility]. He kindly agreed to evaluate the patient in the hospital. Case discussed with hospitalist who kindly agrees to admit the patient.    Disposition:  Admit to hospitalist in guarded condition    Final Impression:  1. Upper GI bleed        Electronically signed by: Vanna Doan, 12/24/2017 4:53 AM

## 2017-12-24 NOTE — ED NOTES
Chief Complaint   Patient presents with   • Upper GI Bleed         Pt bib care flight from [facility] for upper GI bleed with n/v (dark colored blood per pt report), starting at 2200, lasting until midnight. Pt aox4, [place] speaking only, denies pain at time of triage. Pt denies sob, cp, n/v at time of triage. Pt received 80mg protonix , medicated for nausea  1L Ns en route, and arrived on an octreotide gtt. Pt gowned, placed on monitors.       BP (!) 94/50   Pulse 95   Temp 36.8 °C (98.2 °F)   Resp 18   Ht 1.524 m (5')   Wt 50.8 kg (112 lb)   SpO2 93%   BMI 21.87 kg/m²

## 2017-12-24 NOTE — ED NOTES
MD at the bedside. Discussed plan of care. Agreed with admitting MD that ICU would be more appropriate for pt. 3rd IV started.

## 2017-12-24 NOTE — DIETARY
"Nutrition Services: Consult for \"Protein Def\"  67 year old female with admit dx of GI bleed  Past Pertinent Med Hx: anemia, cirrhosis with alcoholism, coagulopathy, liver disease    Pt is currently NPO x1 day. RD will monitor for diet to start and monitor for adequate intake.     Ht: 152.4 cm  Wt 12/24: 50.803 kg - stated  BMI: 21.87  Pertinent Labs: Glucose 121, Creatinine 0.45  Pertinent Meds: folic acid, pericolace, thiamine, protonix  Fluids: NS infusion  Skin: No skin breakdown noted in chart  GI: Last BM PTA    PLAN/RECOMMEND -   1) Nutrition rep to see patient daily for meal and snack preferences, when diet starts  2) Start PO diet when medically feasible.   3) Please obtain measured weight. Weekly weights to monitor fluid and nutrition status  4) Obtain supplement order per RD as needed.    RD following    "

## 2017-12-25 PROBLEM — I85.11 SECONDARY ESOPHAGEAL VARICES WITH BLEEDING (HCC): Status: ACTIVE | Noted: 2017-12-25

## 2017-12-25 PROBLEM — D68.9 COAGULOPATHY (HCC): Status: ACTIVE | Noted: 2017-12-25

## 2017-12-25 PROBLEM — E43 PROTEIN-CALORIE MALNUTRITION, SEVERE (HCC): Status: ACTIVE | Noted: 2017-12-25

## 2017-12-25 PROBLEM — E46 PROTEIN MALNUTRITION (HCC): Status: RESOLVED | Noted: 2017-12-24 | Resolved: 2017-12-25

## 2017-12-25 LAB
ALBUMIN SERPL BCP-MCNC: 2.4 G/DL (ref 3.2–4.9)
ALBUMIN/GLOB SERPL: 0.7 G/DL
ALP SERPL-CCNC: 92 U/L (ref 30–99)
ALT SERPL-CCNC: 34 U/L (ref 2–50)
ANION GAP SERPL CALC-SCNC: 6 MMOL/L (ref 0–11.9)
AST SERPL-CCNC: 67 U/L (ref 12–45)
BASOPHILS # BLD AUTO: 0.7 % (ref 0–1.8)
BASOPHILS # BLD: 0.03 K/UL (ref 0–0.12)
BILIRUB SERPL-MCNC: 1 MG/DL (ref 0.1–1.5)
BUN SERPL-MCNC: 14 MG/DL (ref 8–22)
CALCIUM SERPL-MCNC: 7.8 MG/DL (ref 8.5–10.5)
CFT BLD TEG: 4 MIN (ref 5–10)
CHLORIDE SERPL-SCNC: 112 MMOL/L (ref 96–112)
CHOLEST SERPL-MCNC: 96 MG/DL (ref 100–199)
CLOT ANGLE BLD TEG: 60.3 DEGREES (ref 53–72)
CLOT LYSIS 30M P MA LENFR BLD TEG: 0 % (ref 0–8)
CO2 SERPL-SCNC: 20 MMOL/L (ref 20–33)
CREAT SERPL-MCNC: 0.58 MG/DL (ref 0.5–1.4)
CT.EXTRINSIC BLD ROTEM: 3.2 MIN (ref 1–3)
EOSINOPHIL # BLD AUTO: 0.15 K/UL (ref 0–0.51)
EOSINOPHIL NFR BLD: 3.3 % (ref 0–6.9)
ERYTHROCYTE [DISTWIDTH] IN BLOOD BY AUTOMATED COUNT: 50.3 FL (ref 35.9–50)
GFR SERPL CREATININE-BSD FRML MDRD: >60 ML/MIN/1.73 M 2
GLOBULIN SER CALC-MCNC: 3.6 G/DL (ref 1.9–3.5)
GLUCOSE SERPL-MCNC: 87 MG/DL (ref 65–99)
HCT VFR BLD AUTO: 26.2 % (ref 37–47)
HDLC SERPL-MCNC: 38 MG/DL
HGB BLD-MCNC: 8.1 G/DL (ref 12–16)
HGB BLD-MCNC: 8.2 G/DL (ref 12–16)
IMM GRANULOCYTES # BLD AUTO: 0.01 K/UL (ref 0–0.11)
IMM GRANULOCYTES NFR BLD AUTO: 0.2 % (ref 0–0.9)
LDLC SERPL CALC-MCNC: 45 MG/DL
LYMPHOCYTES # BLD AUTO: 0.56 K/UL (ref 1–4.8)
LYMPHOCYTES NFR BLD: 12.5 % (ref 22–41)
MCF BLD TEG: 48.7 MM (ref 50–70)
MCH RBC QN AUTO: 24.3 PG (ref 27–33)
MCHC RBC AUTO-ENTMCNC: 30.9 G/DL (ref 33.6–35)
MCV RBC AUTO: 78.4 FL (ref 81.4–97.8)
MONOCYTES # BLD AUTO: 0.42 K/UL (ref 0–0.85)
MONOCYTES NFR BLD AUTO: 9.4 % (ref 0–13.4)
NEUTROPHILS # BLD AUTO: 3.31 K/UL (ref 2–7.15)
NEUTROPHILS NFR BLD: 73.9 % (ref 44–72)
NRBC # BLD AUTO: 0 K/UL
NRBC BLD-RTO: 0 /100 WBC
PA AA BLD-ACNC: 59.7 %
PA ADP BLD-ACNC: 37.2 %
PLATELET # BLD AUTO: 72 K/UL (ref 164–446)
POTASSIUM SERPL-SCNC: 3.7 MMOL/L (ref 3.6–5.5)
PROT SERPL-MCNC: 6 G/DL (ref 6–8.2)
RBC # BLD AUTO: 3.34 M/UL (ref 4.2–5.4)
SODIUM SERPL-SCNC: 138 MMOL/L (ref 135–145)
TEG ALGORITHM TGALG: ABNORMAL
TRIGL SERPL-MCNC: 66 MG/DL (ref 0–149)
WBC # BLD AUTO: 4.5 K/UL (ref 4.8–10.8)

## 2017-12-25 PROCEDURE — 700111 HCHG RX REV CODE 636 W/ 250 OVERRIDE (IP): Performed by: INTERNAL MEDICINE

## 2017-12-25 PROCEDURE — 85025 COMPLETE CBC W/AUTO DIFF WBC: CPT

## 2017-12-25 PROCEDURE — 700105 HCHG RX REV CODE 258: Performed by: INTERNAL MEDICINE

## 2017-12-25 PROCEDURE — A9270 NON-COVERED ITEM OR SERVICE: HCPCS | Performed by: HOSPITALIST

## 2017-12-25 PROCEDURE — 90471 IMMUNIZATION ADMIN: CPT

## 2017-12-25 PROCEDURE — 90670 PCV13 VACCINE IM: CPT | Performed by: HOSPITALIST

## 2017-12-25 PROCEDURE — 700101 HCHG RX REV CODE 250: Performed by: HOSPITALIST

## 2017-12-25 PROCEDURE — 85347 COAGULATION TIME ACTIVATED: CPT

## 2017-12-25 PROCEDURE — 700105 HCHG RX REV CODE 258: Performed by: HOSPITALIST

## 2017-12-25 PROCEDURE — 85384 FIBRINOGEN ACTIVITY: CPT

## 2017-12-25 PROCEDURE — 99233 SBSQ HOSP IP/OBS HIGH 50: CPT | Performed by: HOSPITALIST

## 2017-12-25 PROCEDURE — 80053 COMPREHEN METABOLIC PANEL: CPT

## 2017-12-25 PROCEDURE — 700111 HCHG RX REV CODE 636 W/ 250 OVERRIDE (IP): Performed by: HOSPITALIST

## 2017-12-25 PROCEDURE — 85018 HEMOGLOBIN: CPT

## 2017-12-25 PROCEDURE — 700102 HCHG RX REV CODE 250 W/ 637 OVERRIDE(OP): Performed by: HOSPITALIST

## 2017-12-25 PROCEDURE — C9113 INJ PANTOPRAZOLE SODIUM, VIA: HCPCS | Performed by: INTERNAL MEDICINE

## 2017-12-25 PROCEDURE — 770020 HCHG ROOM/CARE - TELE (206)

## 2017-12-25 PROCEDURE — 80061 LIPID PANEL: CPT

## 2017-12-25 PROCEDURE — 85576 BLOOD PLATELET AGGREGATION: CPT | Mod: 91

## 2017-12-25 RX ADMIN — THIAMINE HYDROCHLORIDE 100 MG: 100 INJECTION, SOLUTION INTRAMUSCULAR; INTRAVENOUS at 08:43

## 2017-12-25 RX ADMIN — DIPHENHYDRAMINE HYDROCHLORIDE 12.5 MG: 50 INJECTION, SOLUTION INTRAMUSCULAR; INTRAVENOUS at 23:48

## 2017-12-25 RX ADMIN — STANDARDIZED SENNA CONCENTRATE AND DOCUSATE SODIUM 2 TABLET: 8.6; 5 TABLET, FILM COATED ORAL at 08:44

## 2017-12-25 RX ADMIN — OCTREOTIDE ACETATE 50 MCG/HR: 200 INJECTION, SOLUTION INTRAVENOUS; SUBCUTANEOUS at 14:47

## 2017-12-25 RX ADMIN — FOLIC ACID 1 MG: 5 INJECTION, SOLUTION INTRAMUSCULAR; INTRAVENOUS; SUBCUTANEOUS at 08:44

## 2017-12-25 RX ADMIN — PANTOPRAZOLE SODIUM 40 MG: 40 INJECTION, POWDER, FOR SOLUTION INTRAVENOUS at 11:44

## 2017-12-25 RX ADMIN — PNEUMOCOCCAL 13-VALENT CONJUGATE VACCINE 0.5 ML: 2.2; 2.2; 2.2; 2.2; 2.2; 4.4; 2.2; 2.2; 2.2; 2.2; 2.2; 2.2; 2.2 INJECTION, SUSPENSION INTRAMUSCULAR at 12:26

## 2017-12-25 RX ADMIN — PANTOPRAZOLE SODIUM 40 MG: 40 INJECTION, POWDER, FOR SOLUTION INTRAVENOUS at 20:19

## 2017-12-25 RX ADMIN — CEFTRIAXONE 2 G: 2 INJECTION, POWDER, FOR SOLUTION INTRAMUSCULAR; INTRAVENOUS at 08:44

## 2017-12-25 ASSESSMENT — ENCOUNTER SYMPTOMS
POLYDIPSIA: 0
CHILLS: 0
PHOTOPHOBIA: 0
HEARTBURN: 1
CONSTIPATION: 0
BLOOD IN STOOL: 1
GASTROINTESTINAL NEGATIVE: 1
COUGH: 0
ABDOMINAL PAIN: 1
WEAKNESS: 1
MYALGIAS: 0
NAUSEA: 0
FEVER: 0
DIZZINESS: 0
LOSS OF CONSCIOUSNESS: 0
BRUISES/BLEEDS EASILY: 0
NERVOUS/ANXIOUS: 0
PALPITATIONS: 0
DEPRESSION: 0
HEADACHES: 0
BACK PAIN: 0
SPUTUM PRODUCTION: 0
RESPIRATORY NEGATIVE: 1
EYE DISCHARGE: 0
ORTHOPNEA: 0
VOMITING: 0
HEMOPTYSIS: 0
SHORTNESS OF BREATH: 0
CARDIOVASCULAR NEGATIVE: 1
PND: 0

## 2017-12-25 ASSESSMENT — PAIN SCALES - GENERAL
PAINLEVEL_OUTOF10: 0
PAINLEVEL_OUTOF10: 2
PAINLEVEL_OUTOF10: 0

## 2017-12-25 ASSESSMENT — PATIENT HEALTH QUESTIONNAIRE - PHQ9
2. FEELING DOWN, DEPRESSED, IRRITABLE, OR HOPELESS: NOT AT ALL
SUM OF ALL RESPONSES TO PHQ9 QUESTIONS 1 AND 2: 0
SUM OF ALL RESPONSES TO PHQ QUESTIONS 1-9: 0
1. LITTLE INTEREST OR PLEASURE IN DOING THINGS: NOT AT ALL

## 2017-12-25 ASSESSMENT — LIFESTYLE VARIABLES
ALCOHOL_USE: NO
EVER_SMOKED: YES

## 2017-12-25 NOTE — PROGRESS NOTES
[facility] Hospitalist Progress Note    Date of Service: 2017    Chief Complaint  67 y.o. female admitted 2017 with vomiting blood.    Interval Problem Update  Ms. Gonzales has a hx of Hepatitis C and cirrhosis that has had prior episodes of upper GI bleed in the past that has been taking advil for headaches. She was seen in the ER in [place] due to hematemesis and transferred to [facility] for GI consultation. Due to hypotension, she was admitted to the ICU. She underwent EGD on  by Dr. Currie and was found to have grade III esophageal varices which were banded. Today Ms. Gonzales is tolerating clear liquids though she states that she is experiencing epigastric pain. She has not had a BM today. She denies vomiting.   She has been in a sinus rhythm. Labs ordered for the morning as Hb is 8.1 this morning.  Consultants/Specialty  GI  Critical Care. I discussed her condition with Dr. Gonda on ICU Hot Rounds this morning.    Disposition  tele        Review of Systems   Constitutional: Negative for chills and fever.   Respiratory: Negative for cough and shortness of breath.    Cardiovascular: Negative for chest pain.   Gastrointestinal: Negative for nausea.        Epigastric pain with swallowing.   All other systems reviewed and are negative.     Physical Exam  Laboratory/Imaging   Hemodynamics  Temp (24hrs), Av.9 °C (98.5 °F), Min:36.2 °C (97.2 °F), Max:37.7 °C (99.8 °F)   Temperature: 37.4 °C (99.4 °F)  Pulse  Av.3  Min: 71  Max: 101 Heart Rate (Monitored): 85  Blood Pressure : 108/56, NIBP: 101/55      Respiratory      Respiration: 14, Pulse Oximetry: 93 %             Fluids    Intake/Output Summary (Last 24 hours) at 17 0713  Last data filed at 17 0600   Gross per 24 hour   Intake          1020.67 ml   Output             1080 ml   Net           -59.33 ml       Nutrition  Orders Placed This Encounter   Procedures   • DIET ORDER     Standing Status:   Standing     Number of  Occurrences:   1     Order Specific Question:   Diet:     Answer:   Clear Liquids - No Red Foods [12]     Physical Exam   Constitutional: She is oriented to person, place, and time. No distress.   Neck: Neck supple.   Cardiovascular: Normal rate and regular rhythm.    Murmur heard.  Pulmonary/Chest: Effort normal and breath sounds normal.   Musculoskeletal: She exhibits no edema or tenderness.   Neurological: She is alert and oriented to person, place, and time.   Skin: Skin is warm and dry. She is not diaphoretic.       Recent Labs      12/24/17   0510  12/24/17   2036  12/25/17   0327   WBC  3.7*   --   4.5*   RBC  3.56*   --   3.34*   HEMOGLOBIN  8.7*  8.6*  8.1*   HEMATOCRIT  28.3*  27.9*  26.2*   MCV  79.5*   --   78.4*   MCH  24.4*   --   24.3*   MCHC  30.7*   --   30.9*   RDW  53.1*   --   50.3*   PLATELETCT  81*   --   72*     Recent Labs      12/24/17   0510  12/25/17   0327   SODIUM  139  138   POTASSIUM  4.1  3.7   CHLORIDE  113*  112   CO2  21  20   GLUCOSE  121*  87   BUN  17  14   CREATININE  0.45*  0.58   CALCIUM  8.1*  7.8*     Recent Labs      12/24/17   0830   APTT  40.1*   INR  2.09*         Recent Labs      12/25/17   0327   TRIGLYCERIDE  66   HDL  38*   LDL  45          Assessment/Plan     * GI bleed   Assessment & Plan    Hematemesis prior to admission with anemia and hypotension requiring ICU admission.  Serial Hb with transfusion for Hb <7 or symptomatic  GI consulted  EGD revealed esophageal varices which were banded..  IV protonix.  Hb this morning is 8.1            Secondary esophageal varices with bleeding (CMS-HCC)- (present on admission)   Assessment & Plan    As seen on EGD 12/24.   Octreotide drip.        Coagulopathy (CMS-HCC)- (present on admission)   Assessment & Plan    INR was 2.         Protein-calorie malnutrition, severe (CMS-HCC)- (present on admission)   Assessment & Plan    This is a clinical diagnosis in the setting of cirrhosis,and poor nutritional intake.  Her albumin is  markedly low at 2.3        Hypotension   Assessment & Plan    Requiring ICU admission  Due to blood loss.          Anemia   Assessment & Plan    Due to blood loss from upper GI bleed.        Chronic hepatitis C with cirrhosis (CMS-HCC)   Assessment & Plan    Unsure if patient has completed treatment; she does not know and no reccords found  Outpatient follow up is appropriate.        ALC (alcoholic liver cirrhosis) (CMS-HCC)   Assessment & Plan    On lactulose; hold for now no signs of encephalopathy patient NPO   GI has been consulted   Hx of alcohol abuse states she has not drank in a while; would monitor   Will order Liver u/s         Pancytopenia (CMS-HCC)   Assessment & Plan    Likely secondary to cirrhosis              Reviewed items::  Labs reviewed and Medications reviewed  Jo catheter::  No Jo  DVT prophylaxis pharmacological::  Contraindicated - High bleeding risk  DVT prophylaxis - mechanical:  SCDs

## 2017-12-25 NOTE — CARE PLAN
Problem: Communication  Goal: The ability to communicate needs accurately and effectively will improve  Outcome: PROGRESSING AS EXPECTED  Language line used to communicate with patient.  Pt oriented to call bell and its use.     Problem: Safety  Goal: Will remain free from falls  Outcome: PROGRESSING AS EXPECTED  Bed locked and in low position.  Lower bed rails up.  Bed alarm on.  Room close to nursing station.

## 2017-12-25 NOTE — PROGRESS NOTES
Assumed care of patient. Bedside report received from ZABRINA Ervin. Updated on POC, call light within reach and fall precautions in place. Bed locked and in lowest position. Patient instructed to call for assistance before getting out of bed. All questions answered, no other needs at this time. MAR, labs, orders reviewed.

## 2017-12-25 NOTE — ASSESSMENT & PLAN NOTE
This is a clinical diagnosis in the setting of cirrhosis,and poor nutritional intake.  Her albumin is markedly low at 2.3

## 2017-12-25 NOTE — PROGRESS NOTES
Pulmonary Critical Care Progress Note        Date of service: 12/25/2017    Chief Complaint: GIB    History of Present Illness: 67 y.o. female was transferred in from [facility] for a GI bleed. She presented with about 2 AM with hematemesis. She had a GI bleed about a year ago and again about 3 months ago we don't have any records as of yet. She cannot recall the specific etiology but was told she had hepatitis C. He denies much alcohol took Advil couple times a few days ago and doesn't take it regularly and smokes just a few cigarettes a day. She has no abdominal pain now. She's never told than ulcer or varices. Bleeding history in the family.     ROS:  Review of Systems   Constitutional: Negative for chills and malaise/fatigue.   HENT: Negative for congestion and nosebleeds.    Eyes: Negative for photophobia and discharge.   Respiratory: Negative for cough, hemoptysis, sputum production and shortness of breath.    Cardiovascular: Negative for chest pain, palpitations, orthopnea, leg swelling and PND.   Gastrointestinal: Positive for abdominal pain, blood in stool and heartburn. Negative for constipation, nausea and vomiting.   Genitourinary: Negative for dysuria and frequency.   Musculoskeletal: Negative for back pain and myalgias.   Skin: Negative for rash.   Neurological: Positive for weakness. Negative for dizziness, loss of consciousness and headaches.   Endo/Heme/Allergies: Negative for polydipsia. Does not bruise/bleed easily.   Psychiatric/Behavioral: Negative for depression. The patient is not nervous/anxious.    All other systems reviewed and are negative.  .    Interval Events:  24 hour interval history reviewed    - esophageal varices banded overnight   - stable H/H, vital signs   - remains on octreotide gtt and protonix BID   - Tm 38.1, rocephin day 2 per GI    PFSH:  No change.    Physical Exam   Constitutional: She is oriented to person, place, and time. She appears well-developed and well-nourished. No  distress.   HENT:   Head: Normocephalic and atraumatic.   Mouth/Throat: Oropharynx is clear and moist.   Eyes: Conjunctivae are normal. Pupils are equal, round, and reactive to light.   Neck: Normal range of motion. Neck supple.   Cardiovascular: Normal rate, regular rhythm, normal heart sounds and intact distal pulses.    No murmur heard.  Pulmonary/Chest: Effort normal and breath sounds normal. No respiratory distress.   Abdominal: Soft. Bowel sounds are normal. She exhibits distension. She exhibits no mass. There is no tenderness.   Musculoskeletal: Normal range of motion. She exhibits no edema.   Neurological: She is alert and oriented to person, place, and time. No cranial nerve deficit or sensory deficit.   Skin: Skin is warm and dry. Capillary refill takes less than 2 seconds. No rash noted. No pallor.   Psychiatric: She has a normal mood and affect. Her behavior is normal.   Nursing note and vitals reviewed.    Respiratory:   2 L/m nasal cannula  Pulse Oximetry: 97 %          ImagingAvailable data reviewed         Invalid input(s): [id number]    HemoDynamics:  Pulse: 90, Heart Rate (Monitored): 89  Blood Pressure : 108/56, NIBP: 110/56       Exam: regular rate and rhythm, regular rhythm (Sinus)  Imaging: Available data reviewed - no film today, reviewed        Neuro:  GCS Total [place] Coma Score: 15       Exam: no focal deficits noted mental status intact oriented for age x3  Imaging: Available data reviewed    Fluids:  Intake/Output       12/23/17 0700 - 12/24/17 0659 (Not Admitted) 12/24/17 0700 - 12/25/17 0659 12/25/17 0700 - 12/26/17 0659      [id number] 9809-2693 Total 2567-4422 8191-5042 Total 8662-3163 1873-1335 Total       Intake    P.O.  --  -- --  100  200 300  --  -- --    P.O. -- -- -- 100 200 300 -- -- --    I.V.  --  -- --  39.7  80 119.7  --  -- --    Octreotide Volume -- -- -- 39.7 80 119.7 -- -- --    Blood  --  -- --  601  -- 601  --  -- --    Volume (RELEASE FRESH FROZEN PLASMA) -- --  -- 292 -- 292 -- -- --    Volume (RELEASE FRESH FROZEN PLASMA) -- -- -- 309 -- 309 -- -- --    Total Intake -- -- -- 740.7 280 1020.7 -- -- --       Output    Urine  --  -- --  430  650 1080  --  -- --    Number of Times Voided -- -- -- 1 x 3 x 4 x -- -- --    Void (ml) -- -- --  -- -- --    Stool  --  -- --  --  -- --  --  -- --    Number of Times Stooled -- -- -- 0 x 0 x 0 x -- -- --    Total Output -- -- --  -- -- --       Net I/O     -- -- -- 310.7 -370 -59.3 -- -- --        Weight: 53.5 kg (117 lb 15.1 oz)  Recent Labs      17   0510  17   0327   SODIUM  139  138   POTASSIUM  4.1  3.7   CHLORIDE  113*  112   CO2  21  20   BUN  17  14   CREATININE  0.45*  0.58   CALCIUM  8.1*  7.8*       GI/Nutrition:  Exam: mildly distended, abdomen is soft and non-tender, normal active bowel sounds  Imaging: Available data reviewed - reviewed EGD results  taking PO  Liver Function  Recent Labs      17   0510  17   1005  17   0327   ALTSGPT  39   --   34   ASTSGOT  72*   --   67*   ALKPHOSPHAT  144*   --   92   TBILIRUBIN  0.7   --   1.0   LIPASE  51   --    --    PREALBUMIN   --   5.0*   --    GLUCOSE  121*   --   87       Heme:  Recent Labs      17   0510  17   0830  17   0930  17   1005  17   2036  17   0327  17   0832   RBC  3.56*   --    --    --    --   3.34*   --    HEMOGLOBIN  8.7*   --    --    --   8.6*  8.1*  8.2*   HEMATOCRIT  28.3*   --    --    --   27.9*  26.2*   --    PLATELETCT  81*   --    --    --    --   72*   --    PROTHROMBTM   --   23.2*   --    --    --    --    --    APTT   --   40.1*   --    --    --    --    --    INR   --   2.09*   --    --    --    --    --    IRON   --    --    --   62   --    --    --    FERRITIN   --    --   3.9*   --    --    --    --    TOTIRONBC   --    --    --   409   --    --    --        Infectious Disease:  Temp  Av.1 °C (98.7 °F)  Min: 36.6 °C (97.8 °F)  Max: 38.1 °C  (100.6 °F)  Micro: reviewed  Recent Labs      12/24/17   0510  12/25/17   0327   WBC  3.7*  4.5*   NEUTSPOLYS  73.00*  73.90*   LYMPHOCYTES  14.20*  12.50*   MONOCYTES  8.40  9.40   EOSINOPHILS  3.30  3.30   BASOPHILS  0.80  0.70   ASTSGOT  72*  67*   ALTSGPT  39  34   ALKPHOSPHAT  144*  92   TBILIRUBIN  0.7  1.0     Current Facility-Administered Medications   Medication Dose Frequency Provider Last Rate Last Dose   • fentaNYL (SUBLIMAZE) injection 25 mcg  25 mcg Q HOUR PRN Lewis Gutierrez Jr., D.OAlexander       • thiamine (B-1) 100 mg in D5W 100 mL IVPB  100 mg DAILY Umesh Servin M.D. 200 mL/hr at 12/25/17 0843 100 mg at 12/25/17 0843   • folic acid 1 mg in NS 50 mL IVPB  1 mg Q24HRS Umesh Servin M.D. 100 mL/hr at 12/25/17 0844 1 mg at 12/25/17 0844   • NS infusion   Continuous Vanna Doan M.D.       • senna-docusate (PERICOLACE or SENOKOT S) 8.6-50 MG per tablet 2 Tab  2 Tab BID Umesh Servin M.D.   2 Tab at 12/25/17 0844    And   • polyethylene glycol/lytes (MIRALAX) PACKET 1 Packet  1 Packet QDAY PRN Umesh Servin M.D.        And   • magnesium hydroxide (MILK OF MAGNESIA) suspension 30 mL  30 mL QDAY PRN Umesh Servin M.D.        And   • bisacodyl (DULCOLAX) suppository 10 mg  10 mg QDAY PRN Umesh Servin M.D.       • cefTRIAXone (ROCEPHIN) syringe 2 g  2 g Q24HRS Gen Currie M.D.   2 g at 12/25/17 0844   • octreotide (SANDOSTATIN) 1,250 mcg in  mL Infusion  50 mcg/hr Continuous Gen Currie M.D. 10 mL/hr at 12/24/17 2000 50 mcg/hr at 12/24/17 2000   • pantoprazole (PROTONIX) injection 40 mg  40 mg BID Gen Currie M.D.   40 mg at 12/24/17 2024   • diphenhydrAMINE (BENADRYL) injection 12.5 mg  12.5 mg Q6HRS PRN Dre Davis M.D.   12.5 mg at 12/24/17 1356     Last reviewed on 12/24/2017  7:43 AM by Raymundo Infante [facility]    Quality  Measures:  Radiology images reviewed, Labs reviewed and Medications reviewed  Jo catheter: No Jo      DVT Prophylaxis:  Contraindicated - High bleeding risk  DVT prophylaxis - mechanical: SCDs  Ulcer prophylaxis: Yes  Antibiotics: Treating active infection/contamination beyond 24 hours perioperative coverage  Assessed for rehab: Patient returned to prior level of function, rehabilitation not indicated at this time      Assessment/Plan:  Recurrent upper gastrointestinal hemorrhage from esophageal varices status post banding 12/24              - continue octreotide drip, PPI twice a day   - Clear liquid diet per GI   - Daily CBC, avoid anticoagulants   - Needs outpatient repeat EGD in 2-3 weeks  Hypotension/hemorrhagic shock - improved   - Monitor blood pressure, volume status  History of cirrhosis with hepatitis C versus alcohol              - GI following  Coagulopathy - monitor and correct if needed  Pancytopenia - secondary to EtOH and cirrhosis  Acute blood loss anemia - monitor, conservative transfusion strategy  Hypoalbuminemia  Alcohol abuse - vitamin therapy, monitor for possible withdrawal, alcohol abstinence education  Prophylaxis (SCDs only), diet    Ok to transfer patient out of ICU today. [facility] will sign off at transfer. Please call with questions.    Discussed patient condition and risk of morbidity and/or mortality with RN, RT, Pharmacy, Charge nurse / hot rounds, Patient and GI and hospitalist.

## 2017-12-25 NOTE — PROGRESS NOTES
2 RN skin check with ZABRINA Blankenship. Upper arms had scattered bruising looks like from blood draws. All other skin remains dry and intact.

## 2017-12-25 NOTE — PROGRESS NOTES
Gastroenterology Progress Note     Author: Yunior Landry   Date & Time Created: 2017 3:22 PM    Interval History:  No further signs of GIB    Review of Systems:  Review of Systems   Respiratory: Negative.    Cardiovascular: Negative.    Gastrointestinal: Negative.        Physical Exam:  Physical Exam   Cardiovascular: Normal rate.    Pulmonary/Chest: Effort normal.   Abdominal: Soft.   Skin: Skin is warm and dry.       Labs:        Invalid input(s): [id number]      Recent Labs      17   0510  17   032   SODIUM  139  138   POTASSIUM  4.1  3.7   CHLORIDE  113*  112   CO2  21  20   BUN  17  14   CREATININE  0.45*  0.58   CALCIUM  8.1*  7.8*     Recent Labs      17   0510  17   1005  17   0327   ALTSGPT  39   --   34   ASTSGOT  72*   --   67*   ALKPHOSPHAT  144*   --   92   TBILIRUBIN  0.7   --   1.0   LIPASE  51   --    --    PREALBUMIN   --   5.0*   --    GLUCOSE  121*   --   87     Recent Labs      17   0510  17   0830  17   0930  17   1005  17   2036  17   0327  17   0832   RBC  3.56*   --    --    --    --   3.34*   --    HEMOGLOBIN  8.7*   --    --    --   8.6*  8.1*  8.2*   HEMATOCRIT  28.3*   --    --    --   27.9*  26.2*   --    PLATELETCT  81*   --    --    --    --   72*   --    PROTHROMBTM   --   23.2*   --    --    --    --    --    APTT   --   40.1*   --    --    --    --    --    INR   --   2.09*   --    --    --    --    --    IRON   --    --    --   62   --    --    --    FERRITIN   --    --   3.9*   --    --    --    --    TOTIRONBC   --    --    --   409   --    --    --      Recent Labs      1710  17   WBC  3.7*  4.5*   NEUTSPOLYS  73.00*  73.90*   LYMPHOCYTES  14.20*  12.50*   MONOCYTES  8.40  9.40   EOSINOPHILS  3.30  3.30   BASOPHILS  0.80  0.70   ASTSGOT  72*  67*   ALTSGPT  39  34   ALKPHOSPHAT  144*  92   TBILIRUBIN  0.7  1.0     Hemodynamics:  Temp (24hrs), Av.3 °C (99.2 °F),  Min:36.7 °C (98 °F), Max:38.1 °C (100.6 °F)  Temperature: 37.7 °C (99.8 °F)  Pulse  Av.6  Min: 71  Max: 101Heart Rate (Monitored): 81  Blood Pressure : 108/52, NIBP: 100/53     Respiratory:    Respiration: 16, Pulse Oximetry: 94 %           Fluids:    Intake/Output Summary (Last 24 hours) at 17 1522  Last data filed at 17 1200   Gross per 24 hour   Intake          1678.67 ml   Output              900 ml   Net           778.67 ml        GI/Nutrition:  Orders Placed This Encounter   Procedures   • DIET ORDER     Standing Status:   Standing     Number of Occurrences:   1     Order Specific Question:   Diet:     Answer:   Clear Liquids - No Red Foods [12]     Medical Decision Making, by Problem:  Active Hospital Problems    Diagnosis   • *GI bleed [K92.2]   • Secondary esophageal varices with bleeding (CMS-HCC) [I85.11]   • Protein-calorie malnutrition, severe (CMS-HCC) [E43]   • Coagulopathy (CMS-HCC) [D68.9]   • Pancytopenia (CMS-HCC) [D61.818]   • ALC (alcoholic liver cirrhosis) (CMS-HCC) [K70.30]   • Chronic hepatitis C with cirrhosis (CMS-HCC) [B18.2, K74.60]   • Anemia [D64.9]   • Hypotension [I95.9]       Plan:  GIB 2 2 ETOH induced cirrhosis complicated by EV tx with banding x 5. Continue current bag of Octreotide and PPI. When finished can then discontinue. Continue antibiotics for total 5 days duration. Email sent to [facility] for repeat EGD in 2-3 weeks. Discussed with RN. May advance diet as tolerated. Alcohol abstinence reviewed with the patient. Risk of death in next 90 days > 50% with continued alcohol abuse. Please call with any questions.     Quality-Core Measures

## 2017-12-25 NOTE — PROGRESS NOTES
Late post:  Patient received second transfusion of FFP.  Half way through transfusion patient became urticaric.  Transfusion stopped and line flushed.  Dr. Davis notified of reaction.  Benadryl ordered and given.  Patient's urticaria cleared, orders from Dr. Real to restart transfusion.

## 2017-12-26 PROBLEM — F10.10 ALCOHOL ABUSE: Status: ACTIVE | Noted: 2017-12-26

## 2017-12-26 PROBLEM — Z72.0 TOBACCO ABUSE: Status: ACTIVE | Noted: 2017-12-26

## 2017-12-26 LAB
ERYTHROCYTE [DISTWIDTH] IN BLOOD BY AUTOMATED COUNT: 50.2 FL (ref 35.9–50)
HCT VFR BLD AUTO: 24.9 % (ref 37–47)
HGB BLD-MCNC: 7.8 G/DL (ref 12–16)
MCH RBC QN AUTO: 24.4 PG (ref 27–33)
MCHC RBC AUTO-ENTMCNC: 31.3 G/DL (ref 33.6–35)
MCV RBC AUTO: 77.8 FL (ref 81.4–97.8)
PLATELET # BLD AUTO: 58 K/UL (ref 164–446)
PMV BLD AUTO: 10.9 FL (ref 9–12.9)
RBC # BLD AUTO: 3.2 M/UL (ref 4.2–5.4)
WBC # BLD AUTO: 4.3 K/UL (ref 4.8–10.8)

## 2017-12-26 PROCEDURE — 99233 SBSQ HOSP IP/OBS HIGH 50: CPT | Performed by: INTERNAL MEDICINE

## 2017-12-26 PROCEDURE — 85027 COMPLETE CBC AUTOMATED: CPT

## 2017-12-26 PROCEDURE — 36415 COLL VENOUS BLD VENIPUNCTURE: CPT

## 2017-12-26 PROCEDURE — 700111 HCHG RX REV CODE 636 W/ 250 OVERRIDE (IP): Performed by: INTERNAL MEDICINE

## 2017-12-26 PROCEDURE — 700102 HCHG RX REV CODE 250 W/ 637 OVERRIDE(OP): Performed by: INTERNAL MEDICINE

## 2017-12-26 PROCEDURE — A9270 NON-COVERED ITEM OR SERVICE: HCPCS | Performed by: INTERNAL MEDICINE

## 2017-12-26 PROCEDURE — 770020 HCHG ROOM/CARE - TELE (206)

## 2017-12-26 RX ORDER — OMEPRAZOLE 20 MG/1
20 CAPSULE, DELAYED RELEASE ORAL 2 TIMES DAILY
Status: DISCONTINUED | OUTPATIENT
Start: 2017-12-26 | End: 2017-12-28 | Stop reason: HOSPADM

## 2017-12-26 RX ORDER — ACETAMINOPHEN 650 MG/1
650 SUPPOSITORY RECTAL EVERY 4 HOURS PRN
Status: DISCONTINUED | OUTPATIENT
Start: 2017-12-26 | End: 2017-12-28 | Stop reason: HOSPADM

## 2017-12-26 RX ORDER — ACETAMINOPHEN 325 MG/1
650 TABLET ORAL EVERY 4 HOURS PRN
Status: DISCONTINUED | OUTPATIENT
Start: 2017-12-26 | End: 2017-12-28 | Stop reason: HOSPADM

## 2017-12-26 RX ORDER — OMEPRAZOLE 20 MG/1
20 CAPSULE, DELAYED RELEASE ORAL DAILY
Status: DISCONTINUED | OUTPATIENT
Start: 2017-12-26 | End: 2017-12-26

## 2017-12-26 RX ADMIN — OMEPRAZOLE 20 MG: 20 CAPSULE, DELAYED RELEASE ORAL at 20:36

## 2017-12-26 RX ADMIN — CEFTRIAXONE 2 G: 2 INJECTION, POWDER, FOR SOLUTION INTRAMUSCULAR; INTRAVENOUS at 09:46

## 2017-12-26 RX ADMIN — OMEPRAZOLE 20 MG: 20 CAPSULE, DELAYED RELEASE ORAL at 09:46

## 2017-12-26 ASSESSMENT — PAIN SCALES - GENERAL
PAINLEVEL_OUTOF10: 0

## 2017-12-26 ASSESSMENT — ENCOUNTER SYMPTOMS
MYALGIAS: 0
ABDOMINAL PAIN: 0
CHILLS: 0
DIZZINESS: 0
TREMORS: 0
MEMORY LOSS: 0
NAUSEA: 0
DIARRHEA: 0
COUGH: 0
FEVER: 0
DOUBLE VISION: 0
HEARTBURN: 1
VOMITING: 0
BACK PAIN: 0
SPEECH CHANGE: 0
SHORTNESS OF BREATH: 0
DIAPHORESIS: 0
NERVOUS/ANXIOUS: 0
FLANK PAIN: 0
DEPRESSION: 0
BLURRED VISION: 0
CONSTIPATION: 0
WEAKNESS: 1

## 2017-12-26 NOTE — PROGRESS NOTES
Aleta Connor Fall Risk Assessment:     Last Known Fall:    Mobility:    Medications:    Mental Status/LOC/Awareness:    Toileting Needs:    Volume/Electrolyte Status:    Communication/Sensory:    Behavior:    Aleta Connor Fall Risk Total:    Fall Risk Level:      Universal Fall Precautions:       Fall Risk Level Interventions:          Patient Specific Interventions:     Medication: limit combination of prn medications  Mental Status/LOC/Awareness: check on patient hourly and utilize bed/chair fall alarm  Toileting: instruct male patients prone to dizziness to void while sitting  Volume/Electrolyte Status: administer medications as ordered for nausea and vomiting  Communication/Sensory: ensure proper positioning when transferrng/ambulating  Behavioral: administer medication as ordered  Mobility: utilize bed/chair fall alarm and ensure bed is locked and in lowest position

## 2017-12-26 NOTE — PROGRESS NOTES
Pt laying in bed, denies any needs at this time, educated not to get up without assistance, verbalized understanding, call light in reach, bed in lowest position.

## 2017-12-26 NOTE — PROGRESS NOTES
Aleta Connor Fall Risk Assessment:     Last Known Fall: No falls  Mobility: No limitations  Medications: No meds  Mental Status/LOC/Awareness: Awake, alert, and oriented to date, place, and person  Toileting Needs: No needs  Volume/Electrolyte Status: No problems  Communication/Sensory: No deficits  Behavior: Appropriate behavior  Aleta Connor Fall Risk Total: 3  Fall Risk Level: NO RISK    Universal Fall Precautions:       Fall Risk Level Interventions:          Patient Specific Interventions:     Medication: review medications with patient and family  Mental Status/LOC/Awareness: reinforce the use of call light  Toileting: instruct patient/family on the need to call for assistance when toileting  Volume/Electrolyte Status: monitor abnormal lab values  Communication/Sensory: update plan of care on whiteboard  Behavioral: instruct/reinforce fall program rationale  Mobility: ensure bed is locked and in lowest position

## 2017-12-26 NOTE — CARE PLAN
Problem: Nutritional:  Goal: Achieve adequate nutritional intake  Patient will start diet and consume >50% of meals   Outcome: MET Date Met: 12/26/17  Per chart review, pt's diet advanced to 2 gram sodium diet and PO %. RD available as needed and will rescreen weekly.

## 2017-12-27 LAB
ALBUMIN SERPL BCP-MCNC: 2.4 G/DL (ref 3.2–4.9)
ALBUMIN/GLOB SERPL: 0.7 G/DL
ALP SERPL-CCNC: 108 U/L (ref 30–99)
ALT SERPL-CCNC: 29 U/L (ref 2–50)
ANION GAP SERPL CALC-SCNC: 4 MMOL/L (ref 0–11.9)
AST SERPL-CCNC: 54 U/L (ref 12–45)
BASOPHILS # BLD AUTO: 0.3 % (ref 0–1.8)
BASOPHILS # BLD: 0.01 K/UL (ref 0–0.12)
BILIRUB SERPL-MCNC: 0.7 MG/DL (ref 0.1–1.5)
BUN SERPL-MCNC: 7 MG/DL (ref 8–22)
CALCIUM SERPL-MCNC: 7.6 MG/DL (ref 8.5–10.5)
CHLORIDE SERPL-SCNC: 108 MMOL/L (ref 96–112)
CO2 SERPL-SCNC: 28 MMOL/L (ref 20–33)
CREAT SERPL-MCNC: 0.45 MG/DL (ref 0.5–1.4)
EOSINOPHIL # BLD AUTO: 0.2 K/UL (ref 0–0.51)
EOSINOPHIL NFR BLD: 6 % (ref 0–6.9)
ERYTHROCYTE [DISTWIDTH] IN BLOOD BY AUTOMATED COUNT: 50.4 FL (ref 35.9–50)
GFR SERPL CREATININE-BSD FRML MDRD: >60 ML/MIN/1.73 M 2
GLOBULIN SER CALC-MCNC: 3.6 G/DL (ref 1.9–3.5)
GLUCOSE SERPL-MCNC: 79 MG/DL (ref 65–99)
HCT VFR BLD AUTO: 25.4 % (ref 37–47)
HGB BLD-MCNC: 8 G/DL (ref 12–16)
IMM GRANULOCYTES # BLD AUTO: 0.01 K/UL (ref 0–0.11)
IMM GRANULOCYTES NFR BLD AUTO: 0.3 % (ref 0–0.9)
LYMPHOCYTES # BLD AUTO: 0.53 K/UL (ref 1–4.8)
LYMPHOCYTES NFR BLD: 15.9 % (ref 22–41)
MCH RBC QN AUTO: 25.1 PG (ref 27–33)
MCHC RBC AUTO-ENTMCNC: 31.5 G/DL (ref 33.6–35)
MCV RBC AUTO: 79.6 FL (ref 81.4–97.8)
MONOCYTES # BLD AUTO: 0.51 K/UL (ref 0–0.85)
MONOCYTES NFR BLD AUTO: 15.3 % (ref 0–13.4)
NEUTROPHILS # BLD AUTO: 2.08 K/UL (ref 2–7.15)
NEUTROPHILS NFR BLD: 62.2 % (ref 44–72)
NRBC # BLD AUTO: 0 K/UL
NRBC BLD-RTO: 0 /100 WBC
PLATELET # BLD AUTO: 55 K/UL (ref 164–446)
POTASSIUM SERPL-SCNC: 3.5 MMOL/L (ref 3.6–5.5)
PROT SERPL-MCNC: 6 G/DL (ref 6–8.2)
RBC # BLD AUTO: 3.19 M/UL (ref 4.2–5.4)
SODIUM SERPL-SCNC: 140 MMOL/L (ref 135–145)
WBC # BLD AUTO: 3.3 K/UL (ref 4.8–10.8)

## 2017-12-27 PROCEDURE — A9270 NON-COVERED ITEM OR SERVICE: HCPCS | Performed by: INTERNAL MEDICINE

## 2017-12-27 PROCEDURE — 99232 SBSQ HOSP IP/OBS MODERATE 35: CPT | Performed by: INTERNAL MEDICINE

## 2017-12-27 PROCEDURE — 80053 COMPREHEN METABOLIC PANEL: CPT

## 2017-12-27 PROCEDURE — 85025 COMPLETE CBC W/AUTO DIFF WBC: CPT

## 2017-12-27 PROCEDURE — 700111 HCHG RX REV CODE 636 W/ 250 OVERRIDE (IP): Performed by: INTERNAL MEDICINE

## 2017-12-27 PROCEDURE — 36415 COLL VENOUS BLD VENIPUNCTURE: CPT

## 2017-12-27 PROCEDURE — 700102 HCHG RX REV CODE 250 W/ 637 OVERRIDE(OP): Performed by: INTERNAL MEDICINE

## 2017-12-27 PROCEDURE — 770020 HCHG ROOM/CARE - TELE (206)

## 2017-12-27 RX ADMIN — CEFTRIAXONE 2 G: 2 INJECTION, POWDER, FOR SOLUTION INTRAMUSCULAR; INTRAVENOUS at 09:50

## 2017-12-27 RX ADMIN — OMEPRAZOLE 20 MG: 20 CAPSULE, DELAYED RELEASE ORAL at 09:29

## 2017-12-27 RX ADMIN — OMEPRAZOLE 20 MG: 20 CAPSULE, DELAYED RELEASE ORAL at 19:57

## 2017-12-27 ASSESSMENT — ENCOUNTER SYMPTOMS
CHILLS: 0
TREMORS: 0
COUGH: 0
HEARTBURN: 1
FEVER: 0
NAUSEA: 0
VOMITING: 0
SPEECH CHANGE: 0
ABDOMINAL PAIN: 0
BACK PAIN: 0
SHORTNESS OF BREATH: 0
WEAKNESS: 1
PALPITATIONS: 0
MEMORY LOSS: 0
FLANK PAIN: 0
MYALGIAS: 0
NERVOUS/ANXIOUS: 0

## 2017-12-27 ASSESSMENT — PAIN SCALES - GENERAL
PAINLEVEL_OUTOF10: 0

## 2017-12-27 NOTE — PROGRESS NOTES
Pt sitting on edge of bed eating breakfast, denies pain, reviewed POC, bed in lowest position, call light in reach.

## 2017-12-27 NOTE — PROGRESS NOTES
[facility] Hospitalist Progress Note    Date of Service: 2017    Chief Complaint  67 y.o. female admitted 2017 with vomiting blood.    Interval Problem Update  Ms. Gonzales has a hx of Hepatitis C and cirrhosis that has had prior episodes of upper GI bleed in the past that has been taking advil for headaches. She was seen in the ER in [place] due to hematemesis and transferred to [facility] for GI consultation. Due to hypotension, she was admitted to the ICU. She underwent EGD on  by Dr. Currie and was found to have grade III esophageal varices which were banded. Today Ms. Gonzales is tolerating clear liquids though she states that she is experiencing epigastric pain. She has not had a BM today. She denies vomiting.   She has been in a sinus rhythm. Labs ordered for the morning as Hb is 8.1 this morning.    :  Tolerating CLD, min epigastric pain after eating  Improving lethargy    : Tolerating regular diet  Tired this a.m.  Ambulating with contact guard assist    Consultants/Specialty  GI  Critical Care    Disposition  To home in a.m. with clinical improvement  Follow-up a.m. CBC        Review of Systems   Constitutional: Negative for chills, fever and malaise/fatigue.   HENT: Negative for congestion.    Respiratory: Negative for cough and shortness of breath.    Cardiovascular: Negative for palpitations and leg swelling.   Gastrointestinal: Positive for heartburn. Negative for abdominal pain, melena, nausea and vomiting.        Epigastric pain with swallowing.   Genitourinary: Negative for dysuria, flank pain and hematuria.   Musculoskeletal: Negative for back pain and myalgias.   Neurological: Positive for weakness. Negative for tremors and speech change.   Psychiatric/Behavioral: Negative for memory loss. The patient is not nervous/anxious.       Physical Exam  Laboratory/Imaging   Hemodynamics  Temp (24hrs), Av.3 °C (99.1 °F), Min:36.9 °C (98.5 °F), Max:37.8 °C (100 °F)    Temperature: 37.1 °C (98.7 °F)  Pulse  Av.2  Min: 71  Max: 101    Blood Pressure : (!) 94/48      Respiratory      Respiration: 16, Pulse Oximetry: 99 %             Fluids    Intake/Output Summary (Last 24 hours) at 17 1509  Last data filed at 17 0900   Gross per 24 hour   Intake              240 ml   Output                0 ml   Net              240 ml       Nutrition  Orders Placed This Encounter   Procedures   • DIET ORDER     Standing Status:   Standing     Number of Occurrences:   1     Order Specific Question:   Diet:     Answer:   2 Gram Sodium [7]     Physical Exam   Constitutional: She is oriented to person, place, and time. No distress.   HENT:   Head: Normocephalic and atraumatic.   Mouth/Throat: No oropharyngeal exudate.   Eyes: EOM are normal. Pupils are equal, round, and reactive to light.   Neck: Normal range of motion. No JVD present.   Cardiovascular: Normal rate, regular rhythm and intact distal pulses.    Murmur heard.  Pulmonary/Chest: Effort normal and breath sounds normal. No respiratory distress. She has no wheezes.   Abdominal: Soft.   Musculoskeletal: She exhibits no edema or tenderness.   Neurological: She is alert and oriented to person, place, and time. No cranial nerve deficit. She exhibits normal muscle tone.   Skin: Skin is warm and dry. There is pallor.   Psychiatric: She has a normal mood and affect. Her behavior is normal. Judgment and thought content normal.   Nursing note and vitals reviewed.      Recent Labs      17   0832  17   0330  178   WBC  4.5*   --   4.3*  3.3*   RBC  3.34*   --   3.20*  3.19*   HEMOGLOBIN  8.1*  8.2*  7.8*  8.0*   HEMATOCRIT  26.2*   --   24.9*  25.4*   MCV  78.4*   --   77.8*  79.6*   MCH  24.3*   --   24.4*  25.1*   MCHC  30.9*   --   31.3*  31.5*   RDW  50.3*   --   50.2*  50.4*   PLATELETCT  72*   --   58*  55*   MPV   --    --   10.9   --      Recent Labs      17   023    SODIUM  138  140   POTASSIUM  3.7  3.5*   CHLORIDE  112  108   CO2  20  28   GLUCOSE  87  79   BUN  14  7*   CREATININE  0.58  0.45*   CALCIUM  7.8*  7.6*             Recent Labs      12/25/17   0327   TRIGLYCERIDE  66   HDL  38*   LDL  45          Assessment/Plan     * GI bleed   Assessment & Plan    Hematemesis prior to admission with anemia and hypotension requiring ICU admission.  Serial Hb with transfusion for Hb <7 or symptomatic  GI , Dr. Landry  EGD revealed esophageal varices which were banded..  Cont Rocephin for 5 day course              Secondary esophageal varices with bleeding (CMS-HCC)- (present on admission)   Assessment & Plan    As seen on EGD 12/24.           Alcohol abuse   Assessment & Plan    No signs of withdrawal  H/o, last drink > 1 month ago        Tobacco abuse   Assessment & Plan    Greater than 10 minutes spent smoking cessation counseling patient refused nicotine patch        Coagulopathy (CMS-HCC)- (present on admission)   Assessment & Plan    INR was 2.         Protein-calorie malnutrition, severe (CMS-HCC)- (present on admission)   Assessment & Plan    This is a clinical diagnosis in the setting of cirrhosis,and poor nutritional intake.  Her albumin is markedly low at 2.3        Hypotension   Assessment & Plan    Resolved/borderline  Monitor  Due to blood loss.          Anemia   Assessment & Plan    Due to acute blood loss from upper GI bleed.  egd with esophageal varices s/p banding   CBC in a.m.   on cld  - ppi bid  Off octreotide gtt  B12, folate wnl        Chronic hepatitis C with cirrhosis (CMS-HCC)   Assessment & Plan    Unsure if patient has completed treatment; she does not know and no reccords found  Outpatient follow up is appropriate.  - f/u hep C viral load        ALC (alcoholic liver cirrhosis) (CMS-HCC)   Assessment & Plan    On lactulose  GI   Hx of alcohol abuse states she has not drank in a while; would monitor   U/s with chronic liver dz  - advised etoh  cessation        Pancytopenia (CMS-HCC)   Assessment & Plan    Likely secondary to cirrhosis              Reviewed items::  Labs reviewed and Medications reviewed  Jo catheter::  No Jo  DVT prophylaxis pharmacological::  Contraindicated - High bleeding risk  DVT prophylaxis - mechanical:  SCDs

## 2017-12-27 NOTE — CARE PLAN
Problem: Infection  Goal: Will remain free from infection  Outcome: PROGRESSING AS EXPECTED  Assessed for signs and symptoms of infection. Standard precautions implemented. Education provided to patient and visitors about hand hygiene.      Problem: Knowledge Deficit  Goal: Knowledge of disease process/condition, treatment plan, diagnostic tests, and medications will improve  Outcome: PROGRESSING AS EXPECTED  Pt educated regarding plan of care for the night, activity, diet, and medications. Verbalized understanding.

## 2017-12-27 NOTE — PROGRESS NOTES
Aleta Connor Fall Risk Assessment:     Last Known Fall: No falls  Mobility: No limitations  Medications: No meds  Mental Status/LOC/Awareness: Awake, alert, and oriented to date, place, and person  Toileting Needs: No needs  Volume/Electrolyte Status: No problems  Communication/Sensory: Non-English patient/unable to speak/slurred speech  Behavior: Appropriate behavior  Aleta Connor Fall Risk Total: 5  Fall Risk Level: NO RISK    Universal Fall Precautions:  call light/belongings in reach, bed in low position and locked, wheelchairs and assistive devices out of sight, siderails up x 2, use non-slip footwear, adequate lighting, clutter free and spill free environment, educate on level of risk, educate to call for assistance    Fall Risk Level Interventions:          Patient Specific Interventions:     Medication: review medications with patient and family  Mental Status/LOC/Awareness: reinforce the use of call light  Toileting: instruct patient/family on the use of grab bars  Volume/Electrolyte Status: monitor abnormal lab values  Communication/Sensory: update plan of care on whiteboard  Behavioral: instruct/reinforce fall program rationale  Mobility: ensure bed is locked and in lowest position

## 2017-12-27 NOTE — PROGRESS NOTES
[facility] Hospitalist Progress Note    Date of Service: 2017    Chief Complaint  67 y.o. female admitted 2017 with vomiting blood.    Interval Problem Update  Ms. Gonzales has a hx of Hepatitis C and cirrhosis that has had prior episodes of upper GI bleed in the past that has been taking advil for headaches. She was seen in the ER in [place] due to hematemesis and transferred to [facility] for GI consultation. Due to hypotension, she was admitted to the ICU. She underwent EGD on  by Dr. Currie and was found to have grade III esophageal varices which were banded. Today Ms. Gonzales is tolerating clear liquids though she states that she is experiencing epigastric pain. She has not had a BM today. She denies vomiting.   She has been in a sinus rhythm. Labs ordered for the morning as Hb is 8.1 this morning.    :  Tolerating CLD, min epigastric pain after eating  Improving lethargy    Consultants/Specialty  GI  Critical Care    Disposition  Encourage oob  pending        Review of Systems   Constitutional: Negative for chills, diaphoresis, fever and malaise/fatigue.   HENT: Negative for hearing loss.    Eyes: Negative for blurred vision and double vision.   Respiratory: Negative for cough and shortness of breath.    Cardiovascular: Negative for chest pain and leg swelling.   Gastrointestinal: Positive for heartburn. Negative for abdominal pain, constipation, diarrhea, melena, nausea and vomiting.        Epigastric pain with swallowing.   Genitourinary: Negative for dysuria, flank pain and hematuria.   Musculoskeletal: Negative for back pain and myalgias.   Neurological: Positive for weakness. Negative for dizziness, tremors and speech change.   Psychiatric/Behavioral: Negative for depression and memory loss. The patient is not nervous/anxious.       Physical Exam  Laboratory/Imaging   Hemodynamics  Temp (24hrs), Av.2 °C (98.9 °F), Min:37.1 °C (98.7 °F), Max:37.3 °C (99.2 °F)   Temperature:  37.2 °C (99 °F)  Pulse  Av.6  Min: 71  Max: 101    Blood Pressure : 104/56      Respiratory      Respiration: 16, Pulse Oximetry: 97 %             Fluids  No intake or output data in the 24 hours ending 17 1629    Nutrition  Orders Placed This Encounter   Procedures   • DIET ORDER     Standing Status:   Standing     Number of Occurrences:   1     Order Specific Question:   Diet:     Answer:   2 Gram Sodium [7]     Physical Exam   Constitutional: She is oriented to person, place, and time. No distress.   HENT:   Mouth/Throat: No oropharyngeal exudate.   Eyes: Pupils are equal, round, and reactive to light. No scleral icterus.   Neck: Normal range of motion. Neck supple.   Cardiovascular: Normal rate, regular rhythm and intact distal pulses.    Murmur heard.  Pulmonary/Chest: Effort normal and breath sounds normal. No respiratory distress. She has no wheezes.   Abdominal: Soft.   Musculoskeletal: She exhibits no edema or tenderness.   Neurological: She is alert and oriented to person, place, and time. No cranial nerve deficit. Coordination normal.   Skin: Skin is warm and dry. No rash noted. She is not diaphoretic. No erythema.   Psychiatric: She has a normal mood and affect. Her behavior is normal. Judgment and thought content normal.       Recent Labs      17   0510  176  177  17   0832  17   0330   WBC  3.7*   --   4.5*   --   4.3*   RBC  3.56*   --   3.34*   --   3.20*   HEMOGLOBIN  8.7*  8.6*  8.1*  8.2*  7.8*   HEMATOCRIT  28.3*  27.9*  26.2*   --   24.9*   MCV  79.5*   --   78.4*   --   77.8*   MCH  24.4*   --   24.3*   --   24.4*   MCHC  30.7*   --   30.9*   --   31.3*   RDW  53.1*   --   50.3*   --   50.2*   PLATELETCT  81*   --   72*   --   58*   MPV   --    --    --    --   10.9     Recent Labs      17   0510  17   0327   SODIUM  139  138   POTASSIUM  4.1  3.7   CHLORIDE  113*  112   CO2  21  20   GLUCOSE  121*  87   BUN  17  14   CREATININE   0.45*  0.58   CALCIUM  8.1*  7.8*     Recent Labs      12/24/17   0830   APTT  40.1*   INR  2.09*         Recent Labs      12/25/17   0327   TRIGLYCERIDE  66   HDL  38*   LDL  45          Assessment/Plan     * GI bleed   Assessment & Plan    Hematemesis prior to admission with anemia and hypotension requiring ICU admission.  Serial Hb with transfusion for Hb <7 or symptomatic  GI , Dr. Landry  EGD revealed esophageal varices which were banded..  Cont Rocephin for 5 day course    H/h stable  F/u am labs            Secondary esophageal varices with bleeding (CMS-HCC)- (present on admission)   Assessment & Plan    As seen on EGD 12/24.           Alcohol abuse   Assessment & Plan    No signs of withdrawal  H/o, last drink > 1 month ago        Tobacco abuse   Assessment & Plan    Greater than 10 minutes spent smoking cessation counseling patient refused nicotine patch        Coagulopathy (CMS-HCC)- (present on admission)   Assessment & Plan    INR was 2.         Protein-calorie malnutrition, severe (CMS-HCC)- (present on admission)   Assessment & Plan    This is a clinical diagnosis in the setting of cirrhosis,and poor nutritional intake.  Her albumin is markedly low at 2.3        Hypotension   Assessment & Plan    resolved  Due to blood loss.          Anemia   Assessment & Plan    Due to acute blood loss from upper GI bleed.  egd with esophageal varices s/p banding   H/h stable   on cld  - add ppi bid  Off octreotide gtt  B12, folate wnl        Chronic hepatitis C with cirrhosis (CMS-HCC)   Assessment & Plan    Unsure if patient has completed treatment; she does not know and no reccords found  Outpatient follow up is appropriate.  - f/u hep C viral load        ALC (alcoholic liver cirrhosis) (CMS-HCC)   Assessment & Plan    On lactulose  GI   Hx of alcohol abuse states she has not drank in a while; would monitor   U/s with chronic liver dz  - advised etoh cessation        Pancytopenia (CMS-HCC)   Assessment & Plan     Likely secondary to cirrhosis              Reviewed items::  Labs reviewed and Medications reviewed  Jo catheter::  No Jo  DVT prophylaxis pharmacological::  Contraindicated - High bleeding risk  DVT prophylaxis - mechanical:  SCDs

## 2017-12-27 NOTE — PROGRESS NOTES
Assumed care at 1915. Bedside report received from Day ZABRINA Campos. Patient's chart and MAR reviewed. 12 hour chart check complete. Patient was updated on plan of care for the night. Questions answered and concerns addressed.  Pt denies any additional needs at this time. White board updated. Call light, phone and personal belongings within reach. Vital signs stable

## 2017-12-28 ENCOUNTER — PATIENT OUTREACH (OUTPATIENT)
Dept: HEALTH INFORMATION MANAGEMENT | Facility: OTHER | Age: 67
End: 2017-12-28

## 2017-12-28 VITALS
OXYGEN SATURATION: 90 % | HEART RATE: 82 BPM | SYSTOLIC BLOOD PRESSURE: 115 MMHG | BODY MASS INDEX: 22.67 KG/M2 | RESPIRATION RATE: 17 BRPM | DIASTOLIC BLOOD PRESSURE: 49 MMHG | WEIGHT: 112.43 LBS | HEIGHT: 59 IN | TEMPERATURE: 98.2 F

## 2017-12-28 LAB
BASOPHILS # BLD AUTO: 0.6 % (ref 0–1.8)
BASOPHILS # BLD: 0.02 K/UL (ref 0–0.12)
EOSINOPHIL # BLD AUTO: 0.19 K/UL (ref 0–0.51)
EOSINOPHIL NFR BLD: 6.1 % (ref 0–6.9)
ERYTHROCYTE [DISTWIDTH] IN BLOOD BY AUTOMATED COUNT: 50.4 FL (ref 35.9–50)
HCT VFR BLD AUTO: 26.5 % (ref 37–47)
HCV RNA SERPL NAA+PROBE-ACNC: ABNORMAL IU/ML
HCV RNA SERPL NAA+PROBE-LOG IU: 4.6 LOG IU
HCV RNA SERPL QL NAA+PROBE: DETECTED
HGB BLD-MCNC: 8.3 G/DL (ref 12–16)
IMM GRANULOCYTES # BLD AUTO: 0.01 K/UL (ref 0–0.11)
IMM GRANULOCYTES NFR BLD AUTO: 0.3 % (ref 0–0.9)
LYMPHOCYTES # BLD AUTO: 0.53 K/UL (ref 1–4.8)
LYMPHOCYTES NFR BLD: 17 % (ref 22–41)
MCH RBC QN AUTO: 24.8 PG (ref 27–33)
MCHC RBC AUTO-ENTMCNC: 31.3 G/DL (ref 33.6–35)
MCV RBC AUTO: 79.1 FL (ref 81.4–97.8)
MONOCYTES # BLD AUTO: 0.54 K/UL (ref 0–0.85)
MONOCYTES NFR BLD AUTO: 17.4 % (ref 0–13.4)
NEUTROPHILS # BLD AUTO: 1.82 K/UL (ref 2–7.15)
NEUTROPHILS NFR BLD: 58.6 % (ref 44–72)
NRBC # BLD AUTO: 0 K/UL
NRBC BLD-RTO: 0 /100 WBC
PATHOLOGY STUDY: ABNORMAL
PLATELET # BLD AUTO: 58 K/UL (ref 164–446)
RBC # BLD AUTO: 3.35 M/UL (ref 4.2–5.4)
WBC # BLD AUTO: 3.1 K/UL (ref 4.8–10.8)

## 2017-12-28 PROCEDURE — 36415 COLL VENOUS BLD VENIPUNCTURE: CPT

## 2017-12-28 PROCEDURE — A9270 NON-COVERED ITEM OR SERVICE: HCPCS | Performed by: HOSPITALIST

## 2017-12-28 PROCEDURE — 700102 HCHG RX REV CODE 250 W/ 637 OVERRIDE(OP): Performed by: INTERNAL MEDICINE

## 2017-12-28 PROCEDURE — 700111 HCHG RX REV CODE 636 W/ 250 OVERRIDE (IP): Performed by: INTERNAL MEDICINE

## 2017-12-28 PROCEDURE — 85025 COMPLETE CBC W/AUTO DIFF WBC: CPT

## 2017-12-28 PROCEDURE — A9270 NON-COVERED ITEM OR SERVICE: HCPCS | Performed by: INTERNAL MEDICINE

## 2017-12-28 PROCEDURE — 99239 HOSP IP/OBS DSCHRG MGMT >30: CPT | Performed by: INTERNAL MEDICINE

## 2017-12-28 PROCEDURE — 700102 HCHG RX REV CODE 250 W/ 637 OVERRIDE(OP): Performed by: HOSPITALIST

## 2017-12-28 RX ORDER — OMEPRAZOLE 20 MG/1
20 CAPSULE, DELAYED RELEASE ORAL 2 TIMES DAILY
Qty: 30 CAP | Refills: 1 | Status: SHIPPED | OUTPATIENT
Start: 2017-12-28

## 2017-12-28 RX ORDER — OMEPRAZOLE 20 MG/1
20 CAPSULE, DELAYED RELEASE ORAL 2 TIMES DAILY
Qty: 30 CAP | Refills: 1 | Status: SHIPPED | OUTPATIENT
Start: 2017-12-28 | End: 2017-12-28

## 2017-12-28 RX ADMIN — OMEPRAZOLE 20 MG: 20 CAPSULE, DELAYED RELEASE ORAL at 08:26

## 2017-12-28 RX ADMIN — STANDARDIZED SENNA CONCENTRATE AND DOCUSATE SODIUM 2 TABLET: 8.6; 5 TABLET, FILM COATED ORAL at 08:26

## 2017-12-28 RX ADMIN — CEFTRIAXONE 2 G: 2 INJECTION, POWDER, FOR SOLUTION INTRAMUSCULAR; INTRAVENOUS at 08:26

## 2017-12-28 ASSESSMENT — PAIN SCALES - GENERAL
PAINLEVEL_OUTOF10: 0

## 2017-12-28 NOTE — CARE PLAN
Problem: Communication  Goal: The ability to communicate needs accurately and effectively will improve  Outcome: PROGRESSING AS EXPECTED  Pt. Encouraged to voice concerns and ask questions. Interpretor services provided to effectively communicate needs.     Problem: Knowledge Deficit  Goal: Knowledge of disease process/condition, treatment plan, diagnostic tests, and medications will improve  Outcome: PROGRESSING AS EXPECTED  Pt educated regarding plan of care for the night, activity, diet, and medications. Verbalized understanding.

## 2017-12-28 NOTE — DISCHARGE INSTRUCTIONS
Discharge Instructions    Discharged to home by car with relative. Discharged via wheelchair, hospital escort: Yes.  Special equipment needed: Not Applicable    Be sure to schedule a follow-up appointment with your primary care doctor or any specialists as instructed.     Discharge Plan:   Diet Plan: Discussed  Activity Level: Discussed  Smoking Cessation Offered: Patient Counseled  Confirmed Follow up Appointment: Patient to Call and Schedule Appointment  Confirmed Symptoms Management: Discussed  Medication Reconciliation Updated: Yes  Pneumococcal Vaccine Given - only chart on this line when given: Given (See MAR)  Influenza Vaccine Indication: Not indicated: Previously immunized this influenza season and > 8 years of age    I understand that a diet low in cholesterol, fat, and sodium is recommended for good health. Unless I have been given specific instructions below for another diet, I accept this instruction as my diet prescription.   Other diet: Regular    Special Instructions: None    · Is patient discharged on Warfarin / Coumadin?   No     · Is patient Post Blood Transfusion?  No    Depression / Suicide Risk    As you are discharged from this [facility] facility, it is important to learn how to keep safe from harming yourself.    Recognize the warning signs:  · Abrupt changes in personality, positive or negative- including increase in energy   · Giving away possessions  · Change in eating patterns- significant weight changes-  positive or negative  · Change in sleeping patterns- unable to sleep or sleeping all the time   · Unwillingness or inability to communicate  · Depression  · Unusual sadness, discouragement and loneliness  · Talk of wanting to die  · Neglect of personal appearance   · Rebelliousness- reckless behavior  · Withdrawal from people/activities they love  · Confusion- inability to concentrate     If you or a loved one observes any of these behaviors or has concerns about self-harm, here's  what you can do:  · Talk about it- your feelings and reasons for harming yourself  · Remove any means that you might use to hurt yourself (examples: pills, rope, extension cords, firearm)  · Get professional help from the community (Mental Health, Substance Abuse, psychological counseling)  · Do not be alone:Call your Safe Contact- someone whom you trust who will be there for you.  · Call your local CRISIS HOTLINE 167-6960 or 723-121-4769  · Call your local Children's Mobile Crisis Response Team [place] (711) 369-5424 or wwwDreamCloset.com  · Call the toll free National Suicide Prevention Hotlines   · National Suicide Prevention Lifeline 463-569-XICA (2148)  · National Hope Line Network 800-SUICIDE (314-5317)        Gastrointestinal Bleeding  Gastrointestinal bleeding is bleeding somewhere along the path that food travels through the body (digestive tract). This path is anywhere between the mouth and the opening of the butt (anus). You may have blood in your throw up (vomit) or in your poop (stools). If there is a lot of bleeding, you may need to stay in the hospital.  HOME CARE  · Only take medicine as told by your doctor.  · Eat foods with fiber such as whole grains, fruits, and vegetables. You can also try eating 1 to 3 prunes a day.  · Drink enough fluids to keep your pee (urine) clear or pale yellow.  GET HELP RIGHT AWAY IF:   · Your bleeding gets worse.  · You feel dizzy, weak, or you pass out (faint).  · You have bad cramps in your back or belly (abdomen).  · You have large blood clumps (clots) in your poop.  · Your problems are getting worse.  MAKE SURE YOU:   · Understand these instructions.  · Will watch your condition.  · Will get help right away if you are not doing well or get worse.     This information is not intended to replace advice given to you by your health care provider. Make sure you discuss any questions you have with your health care provider.     Document Released: 09/26/2009  Document Revised: 12/04/2013 Document Reviewed: 11/26/2012  [employer] Patient Education ©2016 [employer].      Hemorragia gastrointestinal  (Gastrointestinal Bleeding)   Jennifer hemorragia gastrointestinal es el sangrado en alguna jose ramon a lo yi del trayecto que sigue la comida a través del cuerpo (tracto digestivo). Puede tj sangrado en cualquier lugar de essence trayecto entre la boca y el orificio anal (ano). Es posible que observe jeremy en el vómito o en la materia fecal (heces). Si el sangrado es muy abundante, es posible que deba permanecer en el hospital.   CUIDADOS EN EL HOGAR  · Sólo debe tati la medicación según las indicaciones del médico.  · Consuma alimentos cyndy granos enteros, frutas y vegetales. También intente comiendo 1 a 3 ciruelas por día  · Melanie gran cantidad de líquido para mantener el pis (orina) de surya henrique o amarillo pálido.  SOLICITE AYUDA DE INMEDIATO SI:   · El sangrado empeora.  · Se siente débil, sufre mareos o se desvanece (se desmaya).  · Siente cólicos intensos en el estómago, en la espalda o en el vientre (abdomen).  · Observa muchos grumos de jeremy (coágulos) en la materia fecal.  · Los síntomas empeoran.  ASEGÚRESE DE QUE:   · Comprende estas instrucciones.  · Controlará davies enfermedad.  · Solicitará ayuda de inmediato si no mejora o empeora.     Esta información no tiene cyndy fin reemplazar el consejo del médico. Asegúrese de hacerle al médico cualquier pregunta que tenga.     Document Released: 01/20/2012 Document Revised: 12/04/2013  [employer] Patient Education ©2016 [employer] Inc.    Esophagogastroduodenoscopy  Esophagogastroduodenoscopy (EGD) is a procedure that is used to examine the lining of the esophagus, stomach, and first part of the small intestine (duodenum). A long, flexible, lighted tube with a camera attached (endoscope) is inserted down the throat to view these organs. This procedure is done to detect problems or abnormalities, such as  inflammation, bleeding, ulcers, or growths, in order to treat them. The procedure lasts 5-20 minutes. It is usually an outpatient procedure, but it may need to be performed in a hospital in emergency cases.  LET YOUR HEALTH CARE PROVIDER KNOW ABOUT:  · Any allergies you have.  · All medicines you are taking, including vitamins, herbs, eye drops, creams, and over-the-counter medicines.  · Previous problems you or members of your family have had with the use of anesthetics.  · Any blood disorders you have.  · Previous surgeries you have had.  · Medical conditions you have.  RISKS AND COMPLICATIONS  Generally, this is a safe procedure. However, problems can occur and include:  · Infection.  · Bleeding.  · Tearing (perforation) of the esophagus, stomach, or duodenum.  · Difficulty breathing or not being able to breathe.  · Excessive sweating.  · Spasms of the larynx.  · Slowed heartbeat.  · Low blood pressure.  BEFORE THE PROCEDURE  · Do not eat or drink anything after midnight on the night before the procedure or as directed by your health care provider.  · Do not take your regular medicines before the procedure if your health care provider asks you not to. Ask your health care provider about changing or stopping those medicines.  · If you wear dentures, be prepared to remove them before the procedure.  · Arrange for someone to drive you home after the procedure.  PROCEDURE  · A numbing medicine (local anesthetic) may be sprayed in your throat for comfort and to stop you from gagging or coughing.  · You will have an IV tube inserted in a vein in your hand or arm. You will receive medicines and fluids through this tube.  · You will be given a medicine to relax you (sedative).  · A pain reliever will be given through the IV tube.  · A mouth guard may be placed in your mouth to protect your teeth and to keep you from biting on the endoscope.  · You will be asked to lie on your left side.  · The endoscope will be inserted  down your throat and into your esophagus, stomach, and duodenum.  · Air will be put through the endoscope to allow your health care provider to clearly view the lining of your esophagus.  · The lining of your esophagus, stomach, and duodenum will be examined. During the exam, your health care provider may:  ¨ Remove tissue to be examined under a microscope (biopsy) for inflammation, infection, or other medical problems.  ¨ Remove growths.  ¨ Remove objects (foreign bodies) that are stuck.  ¨ Treat any bleeding with medicines or other devices that stop tissues from bleeding (hot cautery, clipping devices).  ¨ Widen (dilate) or stretch narrowed areas of your esophagus and stomach.  · The endoscope will be withdrawn.  AFTER THE PROCEDURE  · You will be taken to a recovery area for observation. Your blood pressure, heart rate, breathing rate, and blood oxygen level will be monitored often until the medicines you were given have worn off.  · Do not eat or drink anything until the numbing medicine has worn off and your gag reflex has returned. You may choke.  · Your health care provider should be able to discuss his or her findings with you. It will take longer to discuss the test results if any biopsies were taken.     This information is not intended to replace advice given to you by your health care provider. Make sure you discuss any questions you have with your health care provider.     Document Released: 04/20/2006 Document Revised: 01/08/2016 Document Reviewed: 11/20/2013  [employer] Interactive Patient Education ©2016 [employer]      Esofagogastroduodenoscopia  (Esophagogastroduodenoscopy)  La esofagogastroduodenoscopia (EGD) es un procedimiento que se utiliza para examinar el revestimiento del esófago, del estómago y de la primera porción del intestino iverson (duodeno). Para observar estos órganos, se introduce por la garganta un tubo yi, flexible y que emite milton (endoscopio). Imelda procedimiento se hace para  detectar problemas o anomalías, cyndy inflamación, sangrado, úlceras o tumores, a fin de tratarlos. El procedimiento dura entre 5 y 20 minutos. Por lo general, es un procedimiento ambulatorio, michelet puede tener que realizarse en un hospital en casos de emergencia.  INFORME A DAVIES MÉDICO:  · Cualquier alergia que tenga.  · Todos los medicamentos que utiliza, incluidos vitaminas, hierbas, gotas oftálmicas, cremas y medicamentos de venta daniel.  · Problemas previos que usted o los miembros de davies murray hayan tenido con el uso de anestésicos.  · Enfermedades de la jeremy que tenga.  · Si tiene cirugías previas.  · Enfermedades que tenga.  RIESGOS Y COMPLICACIONES  En general, se trata de un procedimiento seguro. Sin embargo, pueden ocurrir algunos problemas, que incluyen los siguientes:  · Infección.  · Hemorragia.  · Desgarro (perforación) del esófago, el estómago o el duodeno.  · Dificultad o incapacidad para respirar.  · Sudoración excesiva.  · Espasmos de la laringe.  · Latidos cardíacos lentos.  · Presión arterial baja.  ANTES DEL PROCEDIMIENTO  · No coma ni emmy nada después de la medianoche anterior al procedimiento o según lo que le haya indicado el médico.  · No tome dora medicamentos habituales antes del procedimiento si el médico le indica que no lo salbador. Consulte a davies médico si debe cambiar o suspender estos medicamentos.  · Si usa dentadura postiza, esté preparado para quitársela antes del procedimiento.  · Pídale a alguna persona que lo lleve a davies casa luego del procedimiento.  PROCEDIMIENTO  · Es posible que le rocíen la garganta con un anestésico (anestesia local) para davies comodidad y para evitar que tosa o le den náuseas.  · Le colocarán ani vía intravenosa (IV) en la mano o el brazo. Recibirá medicamentos y líquidos a través de essence tubo.  · Le administrarán un medicamento para ayudarlo a relajarse (sedante).  · Le administrarán un analgésico a través de la vía intravenosa (IV).  · Le colocarán un protector  bucal para proteger los dientes y evitar que muerda el endoscopio.  · Le pedirán que se recueste sobre davies costado marilyn.  · Le introducirán el endoscopio por la garganta, hacia el esófago, el estómago y el duodeno.  · Se introducirá aire a través del endoscopio para permitirle al médico observar con claridad el revestimiento del esófago.  · Se examinará el revestimiento del esófago, del estómago y del duodeno. Tiffanie el examen, el médico:  ¨ Extirpará tejido para examinarlo con un microscopio (biopsia) para dominga si hay inflamación, infección u otros problemas médicos.  ¨ Extirpará los tumores.  ¨ Extirpará los objetos (cuerpos extraños) que estén atascados.  ¨ Tratará cualquier sangrado con medicamentos u otros dispositivos que evitan que los tejidos sangren (cauterización con calor, dispositivos de clipado).  ¨ Ensanchará (dilatará) o estirará las áreas estrechas del esófago y del estómago.  · Se retirará el endoscopio.  DESPUÉS DEL PROCEDIMIENTO  · Lo llevarán a un área de recuperación para davies observación. Le controlarán con frecuencia la presión arterial, la frecuencia cardíaca, la frecuencia respiratoria y el nivel de oxígeno en la jeremy hasta que haya desaparecido el efecto de los medicamentos administrados.  · No coma ni emmy nada hasta que el efecto del anestésico haya desaparecido y vuelva a tener reflejo faríngeo. Podría ahogarse.  · El médico debería poder hablar con usted acerca de los datos obtenidos. Se tardará más tiempo en hablar de los resultados del estudio si se tomaron biopsias.     Esta información no tiene cyndy fin reemplazar el consejo del médico. Asegúrese de hacerle al médico cualquier pregunta que tenga.     Document Released: 04/14/2014 Document Revised: 01/08/2016  Elsevier Interactive Patient Education ©2016 Elsevier Inc.

## 2017-12-28 NOTE — DISCHARGE SUMMARY
CHIEF COMPLAINT ON ADMISSION  Chief Complaint   Patient presents with   • Upper GI Bleed       CODE STATUS  Full Code    HPI & HOSPITAL COURSE  This is a 67 y.o. female here with  hx of Hepatitis C and cirrhosis that has had prior episodes of upper GI bleed in the past that has been taking advil for headaches. She was seen in the ER in [place] due to hematemesis and transferred to [facility] for GI consultation. Due to hypotension, she was admitted to the ICU. She underwent EGD on 12/24 by Dr. Currie and was found to have grade III esophageal varices which were banded.   Patient's H&H has remained stable. She is now tolerating a regular diet. Patient has been advised complete alcohol cessation and avoidance of NSAID use. At this time patient has returned to baseline function. She denies any further pain.  She is advised to follow-up as an outpatient for her history of hepatitis C.      Therefore, she is discharged in good and stable condition with close outpatient follow-up.    SPECIFIC OUTPATIENT FOLLOW-UP  Dc clinic/pcp in 1 week  Gi as scheduled      DISCHARGE PROBLEM LIST  Principal Problem:    GI bleed POA: Unknown  Active Problems:    Secondary esophageal varices with bleeding (CMS-HCC) POA: Yes    Pancytopenia (CMS-HCC) POA: Unknown    ALC (alcoholic liver cirrhosis) (CMS-HCC) POA: Unknown    Chronic hepatitis C with cirrhosis (CMS-HCC) POA: Unknown    Anemia POA: Unknown    Hypotension POA: Unknown    Protein-calorie malnutrition, severe (CMS-HCC) POA: Yes    Coagulopathy (CMS-HCC) POA: Yes    Tobacco abuse POA: Unknown    Alcohol abuse POA: Unknown  Resolved Problems:    Protein malnutrition (CMS-HCC) POA: Unknown      FOLLOW UP  No future appointments.  [address] [address]  [phone]  Go on 1/2/2018  Please call or walk in to establish with PCP and hospital follow up.  Thank you      MEDICATIONS ON DISCHARGE   Brigitte Gonzales   Home Medication  Instructions ASH:[id number]    Printed on:12/28/17 1057   Medication Information                      omeprazole (PRILOSEC) 20 MG delayed-release capsule  Take 1 Cap by mouth 2 Times a Day.                 DIET  Orders Placed This Encounter   Procedures   • DIET ORDER     Standing Status:   Standing     Number of Occurrences:   1     Order Specific Question:   Diet:     Answer:   2 Gram Sodium [7]       ACTIVITY  As tolerated.  Weight bearing as tolerated      CONSULTATIONS  GI  Pulmonary/intensivist    PROCEDURES  EGD- s/p banding 12/24    LABORATORY  Lab Results   Component Value Date/Time    SODIUM 140 12/27/2017 02:38 AM    POTASSIUM 3.5 (L) 12/27/2017 02:38 AM    CHLORIDE 108 12/27/2017 02:38 AM    CO2 28 12/27/2017 02:38 AM    GLUCOSE 79 12/27/2017 02:38 AM    BUN 7 (L) 12/27/2017 02:38 AM    CREATININE 0.45 (L) 12/27/2017 02:38 AM        Lab Results   Component Value Date/Time    WBC 3.1 (L) 12/28/2017 03:01 AM    HEMOGLOBIN 8.3 (L) 12/28/2017 03:01 AM    HEMATOCRIT 26.5 (L) 12/28/2017 03:01 AM    PLATELETCT 58 (L) 12/28/2017 03:01 AM        Total time of the discharge process exceeds 45 minutes

## 2017-12-28 NOTE — CARE PLAN
Problem: Venous Thromboembolism (VTW)/Deep Vein Thrombosis (DVT) Prevention:  Goal: Patient will participate in Venous Thrombosis (VTE)/Deep Vein Thrombosis (DVT)Prevention Measures  SCD's ordered for this patient.  Pt intermittently puts them on and takes them off.    Problem: Bowel/Gastric:  Goal: Will not experience complications related to bowel motility  No issues with bowel motility.  Last BM 12/28

## 2017-12-29 NOTE — PROGRESS NOTES
Pt discharged home.  Tele box removed.  IV DC'ed.  Discharge instructions and script provided to pt and family, they verbalize understanding.  Pt walked out with family.

## 2020-05-17 ENCOUNTER — HOSPITAL ENCOUNTER (OUTPATIENT)
Facility: MEDICAL CENTER | Age: 70
End: 2020-05-17

## 2023-01-12 NOTE — PROGRESS NOTES
Assumed care of pt.  Assessment complete.  No signs of distress.  Pt denies any pain.  Discussed plan of care.  Call light within reach.  Bed alarm active.  Treaded socks on pt.  Will continue to monitor.    Complex Repair And Skin Substitute Graft Text: The defect edges were debeveled with a #15 scalpel blade.  The primary defect was closed partially with a complex linear closure.  Given the location of the remaining defect, shape of the defect and the proximity to free margins a skin substitute graft was deemed most appropriate to repair the remaining defect.  The graft was trimmed to fit the size of the remaining defect.  The graft was then placed in the primary defect, oriented appropriately, and sutured into place.

## (undated) DEVICE — SPONGE GAUZE NON-STERILE 4X4 - (2000/CA 10PK/CA)

## (undated) DEVICE — FILM CASSETTE ENDO

## (undated) DEVICE — BITE BLOCK ADULT 60FR (100EA/CA)

## (undated) DEVICE — CON SEDATION/>5 YR 1ST 15 MIN

## (undated) DEVICE — KIT CUSTOM PROCEDURE SINGLE FOR ENDO  (15/CA)

## (undated) DEVICE — BASIN EMESIS DISP. - (250/CA)

## (undated) DEVICE — SPEEDBAND SUPERVIEW SUPER 7 (4/BX)

## (undated) DEVICE — TUBE CONNECTING SUCTION - CLEAR PLASTIC STERILE 72 IN (50EA/CA)